# Patient Record
Sex: FEMALE | Race: WHITE | NOT HISPANIC OR LATINO | Employment: OTHER | URBAN - METROPOLITAN AREA
[De-identification: names, ages, dates, MRNs, and addresses within clinical notes are randomized per-mention and may not be internally consistent; named-entity substitution may affect disease eponyms.]

---

## 2017-01-31 ENCOUNTER — OFFICE VISIT (OUTPATIENT)
Dept: FAMILY MEDICINE | Facility: CLINIC | Age: 67
End: 2017-01-31
Payer: COMMERCIAL

## 2017-01-31 VITALS
TEMPERATURE: 98.1 F | HEART RATE: 53 BPM | RESPIRATION RATE: 14 BRPM | HEIGHT: 63 IN | DIASTOLIC BLOOD PRESSURE: 90 MMHG | SYSTOLIC BLOOD PRESSURE: 169 MMHG | BODY MASS INDEX: 30.95 KG/M2 | WEIGHT: 174.7 LBS

## 2017-01-31 DIAGNOSIS — Z11.59 NEED FOR HEPATITIS C SCREENING TEST: ICD-10-CM

## 2017-01-31 DIAGNOSIS — M85.80 OSTEOPENIA: ICD-10-CM

## 2017-01-31 DIAGNOSIS — Z00.00 ROUTINE GENERAL MEDICAL EXAMINATION AT A HEALTH CARE FACILITY: Primary | ICD-10-CM

## 2017-01-31 DIAGNOSIS — Z12.11 COLON CANCER SCREENING: ICD-10-CM

## 2017-01-31 DIAGNOSIS — I10 BENIGN ESSENTIAL HYPERTENSION: ICD-10-CM

## 2017-01-31 LAB
ALBUMIN SERPL-MCNC: 4.1 G/DL (ref 3.4–5)
ALP SERPL-CCNC: 77 U/L (ref 40–150)
ALT SERPL W P-5'-P-CCNC: 107 U/L (ref 0–50)
ANION GAP SERPL CALCULATED.3IONS-SCNC: 7 MMOL/L (ref 3–14)
AST SERPL W P-5'-P-CCNC: 55 U/L (ref 0–45)
BASOPHILS # BLD AUTO: 0 10E9/L (ref 0–0.2)
BASOPHILS NFR BLD AUTO: 0.3 %
BILIRUB SERPL-MCNC: 0.6 MG/DL (ref 0.2–1.3)
BUN SERPL-MCNC: 13 MG/DL (ref 7–30)
CALCIUM SERPL-MCNC: 9.3 MG/DL (ref 8.5–10.1)
CHLORIDE SERPL-SCNC: 104 MMOL/L (ref 94–109)
CO2 SERPL-SCNC: 27 MMOL/L (ref 20–32)
CREAT SERPL-MCNC: 0.74 MG/DL (ref 0.52–1.04)
CREAT UR-MCNC: 27 MG/DL
DIFFERENTIAL METHOD BLD: NORMAL
EOSINOPHIL # BLD AUTO: 0.1 10E9/L (ref 0–0.7)
EOSINOPHIL NFR BLD AUTO: 1.2 %
ERYTHROCYTE [DISTWIDTH] IN BLOOD BY AUTOMATED COUNT: 13.2 % (ref 10–15)
GFR SERPL CREATININE-BSD FRML MDRD: 78 ML/MIN/1.7M2
GLUCOSE SERPL-MCNC: 115 MG/DL (ref 70–99)
HCT VFR BLD AUTO: 40.8 % (ref 35–47)
HCV AB SERPL QL IA: NORMAL
HGB BLD-MCNC: 14.1 G/DL (ref 11.7–15.7)
LYMPHOCYTES # BLD AUTO: 1.8 10E9/L (ref 0.8–5.3)
LYMPHOCYTES NFR BLD AUTO: 27.2 %
MCH RBC QN AUTO: 32.9 PG (ref 26.5–33)
MCHC RBC AUTO-ENTMCNC: 34.6 G/DL (ref 31.5–36.5)
MCV RBC AUTO: 95 FL (ref 78–100)
MICROALBUMIN UR-MCNC: 5 MG/L
MICROALBUMIN/CREAT UR: 18.5 MG/G CR (ref 0–25)
MONOCYTES # BLD AUTO: 0.6 10E9/L (ref 0–1.3)
MONOCYTES NFR BLD AUTO: 9.4 %
NEUTROPHILS # BLD AUTO: 4.1 10E9/L (ref 1.6–8.3)
NEUTROPHILS NFR BLD AUTO: 61.9 %
PLATELET # BLD AUTO: 230 10E9/L (ref 150–450)
POTASSIUM SERPL-SCNC: 4 MMOL/L (ref 3.4–5.3)
PROT SERPL-MCNC: 7.7 G/DL (ref 6.8–8.8)
RBC # BLD AUTO: 4.28 10E12/L (ref 3.8–5.2)
SODIUM SERPL-SCNC: 138 MMOL/L (ref 133–144)
WBC # BLD AUTO: 6.6 10E9/L (ref 4–11)

## 2017-01-31 PROCEDURE — 99213 OFFICE O/P EST LOW 20 MIN: CPT | Mod: 25 | Performed by: FAMILY MEDICINE

## 2017-01-31 PROCEDURE — 82043 UR ALBUMIN QUANTITATIVE: CPT | Performed by: FAMILY MEDICINE

## 2017-01-31 PROCEDURE — 80053 COMPREHEN METABOLIC PANEL: CPT | Performed by: FAMILY MEDICINE

## 2017-01-31 PROCEDURE — G0145 SCR C/V CYTO,THINLAYER,RESCR: HCPCS | Performed by: FAMILY MEDICINE

## 2017-01-31 PROCEDURE — 85025 COMPLETE CBC W/AUTO DIFF WBC: CPT | Performed by: FAMILY MEDICINE

## 2017-01-31 PROCEDURE — 99397 PER PM REEVAL EST PAT 65+ YR: CPT | Performed by: FAMILY MEDICINE

## 2017-01-31 PROCEDURE — G0476 HPV COMBO ASSAY CA SCREEN: HCPCS | Performed by: FAMILY MEDICINE

## 2017-01-31 PROCEDURE — 86803 HEPATITIS C AB TEST: CPT | Performed by: FAMILY MEDICINE

## 2017-01-31 PROCEDURE — 36415 COLL VENOUS BLD VENIPUNCTURE: CPT | Performed by: FAMILY MEDICINE

## 2017-01-31 RX ORDER — HYDROCHLOROTHIAZIDE 12.5 MG/1
12.5 TABLET ORAL DAILY
Qty: 30 TABLET | Refills: 1 | Status: SHIPPED | OUTPATIENT
Start: 2017-01-31 | End: 2018-02-07

## 2017-01-31 NOTE — Clinical Note
[address] [id number] [phone]      February 6, 2017    Sammi Manzano  [address]  [facility] [address]    Dear Sammi,  We are happy to inform you that your PAP smear result from 01/31/17 is normal.  We are now able to do a follow up test on PAP smears. The DNA test is for HPV (Human Papilloma Virus). Cervical cancer is closely linked with certain types of HPV. Your result showed no evidence of high risk HPV.  Per guidelines, you no longer need to have pap smears completed. You will still need to return to the clinic every year for an annual exam and other preventive tests.    Please return on or after 01/31/18 for your next physical exam.  Please contact my office if you have further questions.    Sincerely,  Pattie Little MD/[facility]

## 2017-01-31 NOTE — NURSING NOTE
"Chief Complaint   Patient presents with     Physical       Initial There were no vitals taken for this visit. Estimated body mass index is 29.59 kg/(m^2) as calculated from the following:    Height as of 3/3/15: 5' 3\" (1.6 m).    Weight as of 3/16/15: 167 lb (75.751 kg).  BP completed using cuff size: regular rt arm Jacinta Mosley MA      "

## 2017-01-31 NOTE — MR AVS SNAPSHOT
After Visit Summary   1/31/2017    Sammi Manzano    MRN: [id number]           Patient Information     Date Of Birth          1950        Visit Information        Provider Department      1/31/2017 7:00 AM Pattie Little MD [facility]        Today's Diagnoses     Benign essential hypertension    -  1     Routine general medical examination at a health care facility         Need for hepatitis C screening test         Osteopenia         Colon cancer screening           Care Instructions      Preventive Health Recommendations  Female Ages 65 +    Yearly exam:     See your health care provider every year in order to  o Review health changes.   o Discuss preventive care.    o Review your medicines if your doctor has prescribed any.      You no longer need a yearly Pap test unless you've had an abnormal Pap test in the past 10 years. If you have vaginal symptoms, such as bleeding or discharge, be sure to talk with your provider about a Pap test.      Every 1 to 2 years, have a mammogram.  If you are over 69, talk with your health care provider about whether or not you want to continue having screening mammograms.      Every 10 years, have a colonoscopy. Or, have a yearly FIT test (stool test). These exams will check for colon cancer.       Have a cholesterol test every 5 years, or more often if your doctor advises it.       Have a diabetes test (fasting glucose) every three years. If you are at risk for diabetes, you should have this test more often.       At age 65, have a bone density scan (DEXA) to check for osteoporosis (brittle bone disease).    Shots:    Get a flu shot each year.    Get a tetanus shot every 10 years.    Talk to your doctor about your pneumonia vaccines. There are now two you should receive - Pneumovax (PPSV 23) and Prevnar (PCV 13).    Talk to your doctor about the shingles vaccine.    Talk to your doctor about the hepatitis B  vaccine.    Nutrition:     Eat at least 5 servings of fruits and vegetables each day.      Eat whole-grain bread, whole-wheat pasta and brown rice instead of white grains and rice.      Talk to your provider about Calcium and Vitamin D.     Lifestyle    Exercise at least 150 minutes a week (30 minutes a day, 5 days a week). This will help you control your weight and prevent disease.      Limit alcohol to one drink per day.      No smoking.       Wear sunscreen to prevent skin cancer.       See your dentist twice a year for an exam and cleaning.      See your eye doctor every 1 to 2 years to screen for conditions such as glaucoma, macular degeneration, cataracts, etc         Follow-ups after your visit        Additional Services     GASTROENTEROLOGY ADULT REF PROCEDURE ONLY       Last Lab Result: CREATININE (mg/dL)       Date                     Value                 03/03/2015               0.77             ----------  Body mass index is 30.95 kg/(m^2).     Needed:  No  Language:  English    Patient will be contacted to schedule procedure.     Please be aware that coverage of these services is subject to the terms and limitations of your health insurance plan.  Call member services at your health plan with any benefit or coverage questions.  Any procedures must be performed at a [facility] facility OR coordinated by your clinic's referral office.    Please bring the following with you to your appointment:    (1) Any X-Rays, CTs or MRIs which have been performed.  Contact the facility where they were done to arrange for  prior to your scheduled appointment.    (2) List of current medications   (3) This referral request   (4) Any documents/labs given to you for this referral                  Future tests that were ordered for you today     Open Future Orders        Priority Expected Expires Ordered    DX Hip/Pelvis/Spine Routine  1/31/2018 1/31/2017    *MA Screening Digital Bilateral Routine  1/31/2018  "2017            Who to contact     If you have questions or need follow up information about today's clinic visit or your schedule please contact [facility] directly at [phone].  Normal or non-critical lab and imaging results will be communicated to you by MyChart, letter or phone within 4 business days after the clinic has received the results. If you do not hear from us within 7 days, please contact the clinic through MyChart or phone. If you have a critical or abnormal lab result, we will notify you by phone as soon as possible.  Submit refill requests through [employer] or call your pharmacy and they will forward the refill request to us. Please allow 3 business days for your refill to be completed.          Additional Information About Your Visit        [facility] Information     [employer] lets you send messages to your doctor, view your test results, renew your prescriptions, schedule appointments and more. To sign up, go to [email or web address] . Click on \"Log in\" on the left side of the screen, which will take you to the Welcome page. Then click on \"Sign up Now\" on the right side of the page.     You will be asked to enter the access code listed below, as well as some personal information. Please follow the directions to create your username and password.     Your access code is: [email or web address]  Expires: 2017  7:58 AM     Your access code will  in 90 days. If you need help or a new code, please call your [place] clinic or [phone].        Care EveryWhere ID     This is your Care EveryWhere ID. This could be used by other organizations to access your [place] medical records  [id number]        Your Vitals Were     Pulse Temperature Respirations Height BMI (Body Mass Index) Breastfeeding?    53 98.1  F (36.7  C) (Oral) 14 5' 3\" (1.6 m) 30.95 kg/m2 No       Blood Pressure from Last 3 Encounters:   17 169/90   03/16/15 142/90   03/03/15 144/92    Weight from Last 3 " Encounters:   01/31/17 174 lb 11.2 oz (79.243 kg)   03/16/15 167 lb (75.751 kg)   03/03/15 165 lb 3.2 oz (74.934 kg)              We Performed the Following     Albumin Random Urine Quantitative     CBC with platelets differential     Comprehensive metabolic panel     GASTROENTEROLOGY ADULT REF PROCEDURE ONLY     Hepatitis C antibody     HPV High Risk Types DNA Cervical     Pap imaged thin layer screen with HPV - recommended age 30 - 65 years (select HPV order below)          Today's Medication Changes          These changes are accurate as of: 1/31/17  7:59 AM.  If you have any questions, ask your nurse or doctor.               Start taking these medicines.        Dose/Directions    hydrochlorothiazide 12.5 MG Tabs tablet   Used for:  Benign essential hypertension   Started by:  Pattie Little MD        Dose:  12.5 mg   Take 1 tablet (12.5 mg) by mouth daily   Quantity:  30 tablet   Refills:  1            Where to get your medicines      These medications were sent to [id number] - [place], [place] - [address]  [address], [facility] [address]     Phone:  [phone]    - hydrochlorothiazide 12.5 MG Tabs tablet             Primary Care Provider Office Phone # Fax #    Pattie Little [phone]       [facility] [address] [place] [address]        Thank you!     Thank you for choosing [facility]  for your care. Our goal is always to provide you with excellent care. Hearing back from our patients is one way we can continue to improve our services. Please take a few minutes to complete the written survey that you may receive in the mail after your visit with us. Thank you!             Your Updated Medication List - Protect others around you: Learn how to safely use, store and throw away your medicines at www.disposemymeds.org.          This list is accurate as of: 1/31/17  7:59 AM.  Always use your most  recent med list.                   Brand Name Dispense Instructions for use    ASPIRIN PO      Take 81 mg by mouth       CALCIUM 600 + D 600-200 MG-UNIT Tabs     3 MONTHS    2 q d       hydrochlorothiazide 12.5 MG Tabs tablet     30 tablet    Take 1 tablet (12.5 mg) by mouth daily

## 2017-01-31 NOTE — PROGRESS NOTES
SUBJECTIVE:     CC: Sammi Manzano is an 66 year old woman who presents for preventive health visit.     Healthy Habits:    Do you get at least three servings of calcium containing foods daily (dairy, green leafy vegetables, etc.)? yes    Amount of exercise or daily activities, outside of work: 6 day(s) per week    Problems taking medications regularly not applicable    Medication side effects: No    Have you had an eye exam in the past two years? yes    Do you see a dentist twice per year? yes    Do you have sleep apnea, excessive snoring or daytime drowsiness?no    Other:  1. Blood pressure- 146-175/.   Gets them checked periodically at [facility] and it has been noted to be high over the last couple of years.     PCV 13 - 11/8/15  PCV 23- 11/14/16 ([facility])    Today's PHQ-2 Score:   PHQ-2 ( 1999 Pf[employer]) 3/3/2015 3/29/2012   Q1: Little interest or pleasure in doing things 0 0   Q2: Feeling down, depressed or hopeless 0 0   PHQ-2 Score 0 0       Abuse: Current or Past(Physical, Sexual or Emotional)- No  Do you feel safe in your environment - Yes    Social History   Substance Use Topics     Smoking status: Former Smoker -- 17 years     Types: Cigarettes     Quit date: 01/01/1986     Smokeless tobacco: Never Used     Alcohol Use: No     none    Recent Labs   Lab Test  03/03/15   0751  01/03/13   0751   CHOL  198  221*   HDL  68  79   LDL  114  118   TRIG  78  118   CHOLHDLRATIO  2.9  2.8       Reviewed orders with patient.  Reviewed health maintenance and updated orders accordingly - Yes    Mammo Decision Support:  Patient over age 50, mutual decision to screen reflected in health maintenance.    Pertinent mammograms are reviewed under the imaging tab.  History of abnormal Pap smear: NO - age 30- 65 PAP every 3 years recommended  All Histories reviewed and updated in Epic.      ROS:  C: NEGATIVE for fever, chills, change in weight  I: NEGATIVE for worrisome rashes, moles or lesions  E:  "NEGATIVE for vision changes or irritation  ENT: NEGATIVE for ear, mouth and throat problems  R: NEGATIVE for significant cough or SOB  B: NEGATIVE for masses, tenderness or discharge  CV: NEGATIVE for chest pain, palpitations or peripheral edema  GI: NEGATIVE for nausea, abdominal pain, heartburn, or change in bowel habits  : NEGATIVE for unusual urinary or vaginal symptoms. No vaginal bleeding.  M: NEGATIVE for significant arthralgias or myalgia  N: NEGATIVE for weakness, dizziness or paresthesias  P: NEGATIVE for changes in mood or affect     Problem list, Medication list, Allergies, and Medical/Social/Surgical histories reviewed in [facility] and updated as appropriate.  OBJECTIVE:     /90 mmHg  Pulse 53  Temp(Src) 98.1  F (36.7  C) (Oral)  Resp 14  Ht 5' 3\" (1.6 m)  Wt 174 lb 11.2 oz (79.243 kg)  BMI 30.95 kg/m2  Breastfeeding? No  EXAM:  GENERAL: healthy, alert and no distress  EYES: Eyes grossly normal to inspection, PERRL and conjunctivae and sclerae normal  HENT: ear canals and TM's normal, nose and mouth without ulcers or lesions  NECK: no adenopathy, no asymmetry, masses, or scars and thyroid normal to palpation  RESP: lungs clear to auscultation - no rales, rhonchi or wheezes  CV: regular rate and rhythm, normal S1 S2, no S3 or S4, no murmur, click or rub, no peripheral edema and peripheral pulses strong  ABDOMEN: soft, nontender, no hepatosplenomegaly, no masses and bowel sounds normal   (female): normal female external genitalia, normal urethral meatus , vaginal mucosal atrophy and normal cervix, adnexae, and uterus without masses.  MS: no gross musculoskeletal defects noted, no edema  SKIN: no suspicious lesions or rashes  NEURO: Normal strength and tone, mentation intact and speech normal  PSYCH: mentation appears normal, affect normal/bright    ASSESSMENT/PLAN:     1. Routine general medical examination at a health care facility  - pap done today- if negative will be her last one as she is " "66.   - Pap imaged thin layer screen with HPV - recommended age 30 - 65 years (select HPV order below)  - HPV High Risk Types DNA Cervical  - CBC with platelets differential  - *MA Screening Digital Bilateral; Future    2. Benign essential hypertension  - from chart review seems patient has had high BP for years. Was started on lisinopril a few years ago but never took it.   Reviewed the importance of treating persistently elevated BP. She verbalizes understanding and is open to starting medication today.   Will return for med/BP check in 1 month or sooner if needed.   - Albumin Random Urine Quantitative  - Comprehensive metabolic panel  - hydrochlorothiazide 12.5 MG TABS tablet; Take 1 tablet (12.5 mg) by mouth daily  Dispense: 30 tablet; Refill: 1    3. Need for hepatitis C screening test  - Hepatitis C antibody    4. Osteopenia  - continue with calcium/vit D  - DX Hip/Pelvis/Spine; Future    5. Colon cancer screening    - GASTROENTEROLOGY ADULT REF PROCEDURE ONLY    COUNSELING:   Reviewed preventive health counseling, as reflected in patient instructions       Regular exercise       Healthy diet/nutrition       Consider Hep C screening for patients born between 1945 and 1965         reports that she quit smoking about 31 years ago. Her smoking use included Cigarettes. She quit after 17 years of use. She has never used smokeless tobacco.    Estimated body mass index is 30.95 kg/(m^2) as calculated from the following:    Height as of this encounter: 5' 3\" (1.6 m).    Weight as of this encounter: 174 lb 11.2 oz (79.243 kg).   Weight management plan: Discussed healthy diet and exercise guidelines and patient will follow up in 12 months in clinic to re-evaluate.    Counseling Resources:  ATP IV Guidelines  Pooled Cohorts Equation Calculator  Breast Cancer Risk Calculator  FRAX Risk Assessment  ICSI Preventive Guidelines  Dietary Guidelines for Americans, 2010  USDA's MyPlate  ASA Prophylaxis  Lung CA Screening    Pattie " Nohemy Little MD  [facility]

## 2017-01-31 NOTE — Clinical Note
[facility]  [address] [place], [place], [address]  [phone]      February 6, 2017    Sammi Manzano  [address] [facility] [address]          Dear Sammi,    The results of your recent tests are back, Recent labs from visit are all unremarkable.   For further questions or concerns please let us know.   Enclosed is a copy of the results.  It was a pleasure to see you at your last appointment.    Results for orders placed or performed in visit on 01/31/17   Albumin Random Urine Quantitative   Result Value Ref Range    Creatinine Urine 27 mg/dL    Albumin Urine mg/L 5 mg/L    Albumin Urine mg/g Cr 18.50 0 - 25 mg/g Cr   Pap imaged thin layer screen with HPV - recommended age 30 - 65 years (select HPV order below)   Result Value Ref Range    PAP NIL     Copath Report         Patient Name: SAMMI DELCID  MR#: [id number]  Specimen #: [id number]  Collected: 1/31/2017  Received: 1/31/2017  Reported: 2/1/2017 14:40  Ordering Phy(s): MYRTLE FOY    For improved result formatting, select 'View Enhanced Report Format'  under Linked Documents section.    SPECIMEN/STAIN PROCESS:  Pap imaged thin layer prep screening (Surepath, FocalPoint with guided  screening)       Pap-Cyto x 1, HPV ordered x 1    SOURCE: Cervical, endocervical  ----------------------------------------------------------------   Pap imaged thin layer prep screening (Surepath, FocalPoint with guided  screening)  SPECIMEN ADEQUACY:  Satisfactory for evaluation.  -Transformation zone component present.    CYTOLOGIC INTERPRETATION:    Negative for Intraepithelial Lesion or Malignancy    Electronically signed out by:  MELINA Russo  (ASCP)    Processed and screened at [facility],  [facility]    CLINICAL HISTORY:    Post Menopaus al, Previous normal pap  Date of Last Pap: 1/3/2013,    Papanicolaou Test Limitations:  Cervical cytology is a screening  test  with limited sensitivity; regular screening is critical for cancer  prevention; Pap tests are primarily effective for the  diagnosis/prevention of squamous cell carcinoma, not adenocarcinomas or  other cancers.    TESTING LAB LOCATION:  [facility]  [address] [place]  [address] [phone]    COLLECTION SITE:  Client:  [facility]  Location: [facility] (R)     HPV High Risk Types DNA Cervical   Result Value Ref Range    HPV 16 DNA Negative NEG    HPV 18 DNA Negative NEG    Other HR HPV Negative NEG    Final Diagnosis       This patient's sample is negative for HPV DNA.  (Note)  METHODOLOGY:  The Roche santa 4800 system uses automated extraction,  simultaneous amplification of HPV (L1 region) and beta-globin,  followed by  real time detection of fluorescent labeled HPV and beta  globin using specific oligonucleotide probes . The test specifically  identifies types HPV 16 DNA and HPV 18 DNA while concurrently  detecting the rest of the high risk types (31, 33, 35, 39, 45, 51,  52, 56, 58, 59, 66 or 68).    COMMENTS:  This test is not intended for use as a screening device  for women under age 30 with normal cervical cytology.  Results should  be correlated with cytologic and histologic findings. Close clinical  followup is recommended.    This test was developed and its performance characteristics  determined by the [facility], [facility]. It has not been cleared or approved by the  FDA. The laboratory is regulated under CLIA as qualified to perform  high- complexity testing. This test is used for clinical purposes. It  should not be regarded as investigational or for research.        Specimen Description Cervical Cells  [id number]      CBC with platelets differential   Result Value Ref Range    WBC 6.6 4.0 - 11.0 10e9/L    RBC Count 4.28 3.8 - 5.2 10e12/L    Hemoglobin 14.1 11.7 - 15.7 g/dL    Hematocrit 40.8 35.0 - 47.0  %    MCV 95 78 - 100 fl    MCH 32.9 26.5 - 33.0 pg    MCHC 34.6 31.5 - 36.5 g/dL    RDW 13.2 10.0 - 15.0 %    Platelet Count 230 150 - 450 10e9/L    Diff Method Automated Method     % Neutrophils 61.9 %    % Lymphocytes 27.2 %    % Monocytes 9.4 %    % Eosinophils 1.2 %    % Basophils 0.3 %    Absolute Neutrophil 4.1 1.6 - 8.3 10e9/L    Absolute Lymphocytes 1.8 0.8 - 5.3 10e9/L    Absolute Monocytes 0.6 0.0 - 1.3 10e9/L    Absolute Eosinophils 0.1 0.0 - 0.7 10e9/L    Absolute Basophils 0.0 0.0 - 0.2 10e9/L   Comprehensive metabolic panel   Result Value Ref Range    Sodium 138 133 - 144 mmol/L    Potassium 4.0 3.4 - 5.3 mmol/L    Chloride 104 94 - 109 mmol/L    Carbon Dioxide 27 20 - 32 mmol/L    Anion Gap 7 3 - 14 mmol/L    Glucose 115 (H) 70 - 99 mg/dL    Urea Nitrogen 13 7 - 30 mg/dL    Creatinine 0.74 0.52 - 1.04 mg/dL    GFR Estimate 78 >60 mL/min/1.7m2    GFR Estimate If Black >90   GFR Calc   >60 mL/min/1.7m2    Calcium 9.3 8.5 - 10.1 mg/dL    Bilirubin Total 0.6 0.2 - 1.3 mg/dL    Albumin 4.1 3.4 - 5.0 g/dL    Protein Total 7.7 6.8 - 8.8 g/dL    Alkaline Phosphatase 77 40 - 150 U/L     (H) 0 - 50 U/L    AST 55 (H) 0 - 45 U/L   Hepatitis C antibody   Result Value Ref Range    Hepatitis C Antibody  NR     Nonreactive   Assay performance characteristics have not been established for newborns,   infants, and children           If you have any questions or concerns, please call myself or my nurse at [phone].          Sincerely,    Pattie Little MD/[facility]  [id number]

## 2017-02-01 ENCOUNTER — RADIANT APPOINTMENT (OUTPATIENT)
Dept: MAMMOGRAPHY | Facility: CLINIC | Age: 67
End: 2017-02-01
Payer: COMMERCIAL

## 2017-02-01 DIAGNOSIS — Z00.00 ROUTINE GENERAL MEDICAL EXAMINATION AT A HEALTH CARE FACILITY: ICD-10-CM

## 2017-02-01 LAB
COPATH REPORT: NORMAL
PAP: NORMAL

## 2017-02-01 PROCEDURE — G0202 SCR MAMMO BI INCL CAD: HCPCS | Mod: TC

## 2017-02-03 LAB
FINAL DIAGNOSIS: NORMAL
HPV HR 12 DNA CVX QL NAA+PROBE: NEGATIVE
HPV16 DNA SPEC QL NAA+PROBE: NEGATIVE
HPV18 DNA SPEC QL NAA+PROBE: NEGATIVE
SPECIMEN DESCRIPTION: NORMAL

## 2017-02-03 NOTE — PROGRESS NOTES
Quick Note:    Please send patient a letter to let them know results are normal.    Recent labs from visit are all unremarkable.   For further questions or concerns please let us know.     Sincerely,    Dr. Salguero  ______

## 2017-02-22 ENCOUNTER — HOSPITAL ENCOUNTER (OUTPATIENT)
Facility: CLINIC | Age: 67
Discharge: HOME | End: 2017-02-22
Attending: INTERNAL MEDICINE | Admitting: INTERNAL MEDICINE
Payer: COMMERCIAL

## 2017-02-22 VITALS
BODY MASS INDEX: 30.83 KG/M2 | OXYGEN SATURATION: 95 % | SYSTOLIC BLOOD PRESSURE: 123 MMHG | WEIGHT: 174 LBS | DIASTOLIC BLOOD PRESSURE: 83 MMHG | HEIGHT: 63 IN | RESPIRATION RATE: 14 BRPM | HEART RATE: 77 BPM

## 2017-02-22 LAB — COLONOSCOPY: NORMAL

## 2017-02-22 PROCEDURE — G0121 COLON CA SCRN NOT HI RSK IND: HCPCS | Performed by: INTERNAL MEDICINE

## 2017-02-22 PROCEDURE — 25000128 H RX IP 250 OP 636: Performed by: INTERNAL MEDICINE

## 2017-02-22 PROCEDURE — 25000125 ZZHC RX 250: Performed by: INTERNAL MEDICINE

## 2017-02-22 PROCEDURE — G0500 MOD SEDAT ENDO SERVICE >5YRS: HCPCS | Performed by: INTERNAL MEDICINE

## 2017-02-22 PROCEDURE — 45378 DIAGNOSTIC COLONOSCOPY: CPT | Performed by: INTERNAL MEDICINE

## 2017-02-22 RX ORDER — ONDANSETRON 4 MG/1
4 TABLET, ORALLY DISINTEGRATING ORAL EVERY 6 HOURS PRN
Status: DISCONTINUED | OUTPATIENT
Start: 2017-02-22 | End: 2017-02-22 | Stop reason: HOSPADM

## 2017-02-22 RX ORDER — FLUMAZENIL 0.1 MG/ML
0.2 INJECTION, SOLUTION INTRAVENOUS
Status: DISCONTINUED | OUTPATIENT
Start: 2017-02-22 | End: 2017-02-22 | Stop reason: HOSPADM

## 2017-02-22 RX ORDER — FENTANYL CITRATE 50 UG/ML
INJECTION, SOLUTION INTRAMUSCULAR; INTRAVENOUS PRN
Status: DISCONTINUED | OUTPATIENT
Start: 2017-02-22 | End: 2017-02-22 | Stop reason: HOSPADM

## 2017-02-22 RX ORDER — ONDANSETRON 2 MG/ML
4 INJECTION INTRAMUSCULAR; INTRAVENOUS
Status: DISCONTINUED | OUTPATIENT
Start: 2017-02-22 | End: 2017-02-22 | Stop reason: HOSPADM

## 2017-02-22 RX ORDER — NALOXONE HYDROCHLORIDE 0.4 MG/ML
.1-.4 INJECTION, SOLUTION INTRAMUSCULAR; INTRAVENOUS; SUBCUTANEOUS
Status: DISCONTINUED | OUTPATIENT
Start: 2017-02-22 | End: 2017-02-22 | Stop reason: HOSPADM

## 2017-02-22 RX ORDER — ONDANSETRON 2 MG/ML
4 INJECTION INTRAMUSCULAR; INTRAVENOUS EVERY 6 HOURS PRN
Status: DISCONTINUED | OUTPATIENT
Start: 2017-02-22 | End: 2017-02-22 | Stop reason: HOSPADM

## 2017-02-22 RX ORDER — LIDOCAINE 40 MG/G
CREAM TOPICAL
Status: DISCONTINUED | OUTPATIENT
Start: 2017-02-22 | End: 2017-02-22 | Stop reason: HOSPADM

## 2017-02-22 NOTE — LETTER
January 31, 2017      Sammi Manzano  [address]  [facility] [address]              Dear Sammi Manzano,    Thank you for choosing [facility]. You are scheduled for the following service(s).   Miralax Prep    Procedure:   Colonoscopy   Provider:         Dr. Ferreira  Date:    2-22-17                Arrival Time:   0700  *check in at Emergency/Endoscopy desk*  Procedure Time:  0730     Location:   [facility]      Endoscopy Department, First Floor (Enter through ER Doors) *       [address] [place], [place] [address]    [phone] or [phone] () to reschedule   What is a colonoscopy?  A colonoscopy is the most accurate test to detect colon polyps and colon cancer, and the only test where polyps can be removed. During this procedure, a doctor examines the lining of your large intestine and rectum through a flexible tube called a colonoscope.   To produce the best and most accurate results, your colon must be completely clean. You will drink a special bowel cleansing preparation to help clean out your colon. You will also need to follow a special diet several days prior to your scheduled colonoscopy.  What happens during a colonoscopy?  Plan to spend up to two hours, starting at registration time, at the endoscopy center the day of your procedure. The exam itself takes approximately 15 minutes to complete, and recovery is approximately 30 minutes.     Before the exam:    You will change into a gown.    Your medical history will be reviewed with you and you will be given a consent form to sign.     A nurse will insert an intravenous (IV) line into your hand or arm.  During the exam:     Medicine will be given through the IV line to help you relax and feel drowsy.     Your heart rate and oxygen levels will be monitored. If your blood pressure is low, you may be given fluids through the IV line.     The doctor will insert a  flexible hollow tube, called a colonoscope, into your rectum.   The scope will be advanced slowly through the large intestine (colon).    You may have a feeling of pressure or fullness.     If an abnormal tissue, or a polyp are found, the doctor may remove it through the endoscope for closer examination, or biopsy. Tissue removal is painless.  What happens after the exam?           The doctor will talk with you about the initial results of your exam.     The doctor will prepare a full report for the physician who referred you for the colonoscopy.     You may feel bloated after the procedure. This is normal.     Medication given during the exam will prohibit you from driving for the rest of the day.     Following the exam, you may resume your normal diet. Avoid alcohol until the next day.     You may resume your regular activities the day after the procedure.     A nurse will provide you with complete discharge instructions before you leave the endoscopy center. Be sure to ask the nurse for specific instructions if you take blood thinners such as aspirin, Coumadin or Plavix.     Any tissue samples removed during the exam will be sent to a lab for evaluation. It may take 5-7 working days for you to be notified of the results.     Miralax-Gatorade  If you have diabetes, ask your regular doctor for diet and medication restrictions.   If you take a medication to thin your blood, such as coumadin or warfarin, please call your primary care provider for directions on when to stop this medication.  If you take aspirin, you may continue to do so.  If you are or may be pregnant, please discuss the risks and benefits of this procedure with your doctor.  You must arrange for a ride for the day of your exam. If you fail to arrange transportation with a responsible adult (someone you know and trust) your procedure will need to be cancelled and rescheduled.  If you must cancel or reschedule your appointment, please call  [phone] as soon as possible.        PREPARATION  To ensure a successful exam, please follow all instructions carefully. Failure to accurately and completely prepare for your exam may result in the need for an additional procedure and both procedures will be billed to your insurance.     Purchase the following over-the-counter supplies at your local pharmacy:      ? 2 tablets bisacodyl, each containing 5 mg of bisacodyl (Dulcolax  laxative NOT Dulcolax  stool softener)   ? 1-8.3 oz. bottle Miralax  powder (238 grams)   ? 64 oz. Gatorade  liquid (NOT red; NOT powdered). Regular Gatorade , Gatorade G2 , Powerade  or  PoweradeZero  are acceptable.   ? 1-10 oz. bottle Magnesium Citrate (NOT red)  7 days before your exam:  Discontinue fiber supplements or medications containing iron. This includes multivitamins with iron, Metamucil  and Fibercon .    3 Days prior to your exam:  Stop eating all high-fiber foods and begin a Low-Fiber Diet. A low fiber diet helps make the cleanout more effective.     Examples of a Low Fiber Diet include:     White bread, white rice, pasta, potato without skin, plain crackers     Fish, white meat chicken, eggs, peanut butter without nuts     Clear beverages (apple juice, white grape juice, Sprite , sparkling water, Gatorade )     Cooked carrots, cooked green beans, cooked spinach        Milk, plain yogurt, cheese     Jelly, salt, pepper, sugar  Avoid: Raw fruits or vegetables, whole wheat or high fiber foods, seeds, nuts, popcorn, bran or bulking agents     2 days prior to your exam     Continue Low Fiber Diet.     Drink at least 8 (eight ounces) glasses of water throughout the day.     Refrigerate the Gatorade  or Powerade , if you wish to drink it cold.     Stop eating solid foods at 11:45 pm.    1 day prior to your exam  Start a Clear Liquid Diet   Examples of a Clear Liquid Diet:     No red liquids; No coffee; No alcohol; No dairy products     May drink clear caffeinated  beverages    Water: drink at least 8 glasses of water during the day     Tea (do not add milk or creamer)     Clear broth or bouillon     Gatorade , Pedialyte  or Powerade  (No red)     Carbonated and non-carbonated soft drinks (Sprite , 7-Up , Ginger ale)     Strained fruit juices without pulp (apple, white grape, white cranberry)     Jell-O , popsicles, hard candy (No red)    At 12 Noon:  Take the 2 bisacodyl (Dulcolax ) tablets    At 4 PM (no later than 6 PM)    Mix 1 bottle of Miralax  (8.3 oz) with 64 oz. of Gatorade  in a large pitcher.     Drink 1 - 8 oz. glass of the Miralax /Gatorade  solution.     Continue drinking 1 - 8 oz. glass every 15 minutes thereafter until the mixture is gone.     Continue clear liquid diet     Colon Cleansing Tips     If you experience nausea or vomiting while taking the prep, rinse your mouth with water,  or mouthwash , take a 15-30 minute break, and then continue taking the prep solution. If you are still unable to complete the prep, without severe nausea or vomiting, you will need to contact your primary care provider (the provider who ordered the test) for a possible anti-nausea medication. Or if you are prone to nausea you may want to ask your primary care provider to prescribe an as needed anti-nausea medication that you may have on hand.    Chill the Miralax /Gatorade  solution in the refrigerator. DO NOT add ice to the solution or your drinking glass.      Set a timer for every 15 minutes. Drink each 8 - oz. glass of solution quickly to help flush your colon.     Stay near a toilet! You will have diarrhea.     Even if you are sitting on the toilet, continue to drink the cleansing solution every 15 minutes.     Drink all of the solution until it is gone.     You will be uncomfortable until the stool has flushed from your colon (in about 2-4 hours). You may feel chilled.     You may suck on a few hard candies (NO red).     Alcohol-free baby wipes or Vaseline  may help ease  skin irritation.     Over-the-counter hydrocortisone creams, hemorrhoid treatments or Tucks may be used if desired.    The day of your exam:            Continue clear liquid diet. Do not eat solid foods.     You may take all of your morning medications.    4 hours before your procedure:     Drink 10 oz. of Magnesium Citrate.    2 hours before your procedure:     Stop drinking clear liquids.     Allow extra time to travel to your procedure as you may need to stop and use a restroom along the way.  You are ready for the exam, if you followed all instructions and your stool is no longer formed, but clear or yellow liquid. If you are unsure whether your colon is clean, please call our department at [phone] before you leave for your appointment.      Bring a list of all of your current medications, including any allergy or over-the-counter medications.      Bring a photo ID, as well as up-to-date insurance information, such as your insurance card and any referral forms that might be required by your insurance company.  DIRECTIONS  From the north ([facility], [place])  Take [address] south, exit to [address] [address]. Get into the left hand lucie, turn left (east), go one-half mile to [address]. Turn left (north) on [address]. Go north to first stoplight, take a right on the newly constructed [address] and follow it to the Emergency entrance.  From the south ([facility])  Take 35 north to the east split, [address], and exit to [address] [address]. Turn left (west) on [address] [address] to [address]. Turn right (north) on [address]. Go north to first stoplight, take a right on the newly [address] and follow it to the Emergency entrance.    From the east via 35E ([facility])  Take 35E south to [address] [address] exit. Turn right on [address] [address]. Go west to [address]. Turn right (north) on [address], go to the first stoplight, take a right and  follow the newly constructed road, [address], to the Emergency entrance.    From the east via [address] ([facility])  Take [address] west to [address]. Turn left (south) on [address] to [address]. Turn left (east) on [address] and follow the newly constructed road to the Emergency entrance.    From the west via Highway 13 ([place])  Take [address] east to [address]. Turn right (south) on [address] to [address].  Turn left (east) on [address] and follow the newly constructed road to the Emergency entrance.  Cut along line  -------------------------------------------------------------------------------------------------------------------  SHOPPING LIST FOR OVER-THE-COUNTER COLONOSCOPY PREP:  ? 2 tablets bisacodyl, each containing 5 mg of bisacodyl (Dulcolax  laxative                     NOT Dulcolax  stool softener)   ? 1-8.3 oz. bottle Miralax  powder (238 grams)   ? 64 oz. Gatorade  liquid (NOT red; NOT powdered). Regular Gatorade ,                     Gatorade G2 , Powerade  or  PoweradeZero  are acceptable.   ? 1-10 oz. bottle Magnesium Citrate (NOT red)

## 2017-02-22 NOTE — H&P
Pre-Endoscopy History and Physical     Sammi Manzano MRN# [id number]   YOB: 1950 Age: 66 year old     Date of Procedure: 2/22/2017  Primary care provider: Pattie Little  Type of Endoscopy: Colonoscopy with possible biopsy, possible polypectomy  Reason for Procedure: screen  Type of Anesthesia Anticipated: Conscious Sedation    HPI:    Sammi is a 66 year old female who will be undergoing the above procedure.      A history and physical has been performed. The patient's medications and allergies have been reviewed. The risks and benefits of the procedure and the sedation options and risks were discussed with the patient.  All questions were answered and informed consent was obtained.      She denies a personal or family history of anesthesia complications or bleeding disorders.     Patient Active Problem List   Diagnosis     Hyperlipidemia     Osteopenia     HYPERLIPIDEMIA LDL GOAL <160     Glucose intolerance (impaired glucose tolerance)     Family history of diabetes mellitus     HL (hearing loss)     Diverticulosis     Advanced directives, counseling/discussion     Overweight (BMI 25.0-29.9)     OA (osteoarthritis) of knee     Cough - postinfectious     Chronic cough        Past Medical History   Diagnosis Date     Chronic cough 3/7/2015     Diverticulosis 3/28/2011     Glucose intolerance (impaired glucose tolerance) 3/28/2011     HL (hearing loss) 3/28/2011     Hyperlipidemia LDL goal <160 10/31/2010     Hypertension      OA (osteoarthritis) of knee 1/12/2013     Osteopenia 2/18/2009     Overweight (BMI 25.0-29.9) 3/29/2012        Past Surgical History   Procedure Laterality Date     Tonsillectomy       at younger age-?4-5       Social History   Substance Use Topics     Smoking status: Former Smoker     Years: 17.00     Types: Cigarettes     Quit date: 1/1/1986     Smokeless tobacco: Never Used     Alcohol use No       Family History   Problem Relation Age of Onset      "DIABETES Mother      alive 88 in 2010,stroke in 2012     Psychotic Disorder Mother      Dementia     Hypertension Mother      CANCER Father      liver ca     Family History Negative Maternal Grandmother      HEART DISEASE Maternal Grandfather      heart attack at age of ?AGE     CANCER Paternal Grandmother      not known details     Family History Negative Paternal Grandfather       from old age     Family History Negative Brother      alive 57     Breast Cancer No family hx of      Cancer - colorectal No family hx of        Prior to Admission medications    Medication Sig Start Date End Date Taking? Authorizing Provider   hydrochlorothiazide 12.5 MG TABS tablet Take 1 tablet (12.5 mg) by mouth daily 17  Yes Pattie Little MD   ASPIRIN PO Take 81 mg by mouth    Reported, Patient   CALCIUM 600 + D 600-200 MG-UNIT OR TABS 2 q d 07   Catherine Viera MD       Allergies   Allergen Reactions     No Known Drug Allergy         REVIEW OF SYSTEMS:   5 point ROS negative except as noted above in HPI, including Gen., Resp., CV, GI &  system review.    PHYSICAL EXAM:   There were no vitals taken for this visit. Estimated body mass index is 30.95 kg/(m^2) as calculated from the following:    Height as of 17: 1.6 m (5' 3\").    Weight as of 17: 79.2 kg (174 lb 11.2 oz).   GENERAL APPEARANCE: alert, and oriented  MENTAL STATUS: alert  AIRWAY EXAM: Mallampatti Class I (visualization of the soft palate, fauces, uvula, anterior and posterior pillars)  RESP: lungs clear to auscultation - no rales, rhonchi or wheezes  CV: regular rates and rhythm  DIAGNOSTICS:    Not indicated    IMPRESSION   ASA Class 1 - Healthy patient, no medical problems    PLAN:   Plan for Colonoscopy with possible biopsy, possible polypectomy. We discussed the risks, benefits and alternatives and the patient wished to proceed.    The above has been forwarded to the consulting provider.      Signed Electronically by: Lenin ODEN" Hanna  February 22, 2017

## 2017-03-01 ENCOUNTER — RADIANT APPOINTMENT (OUTPATIENT)
Dept: BONE DENSITY | Facility: CLINIC | Age: 67
End: 2017-03-01
Attending: FAMILY MEDICINE
Payer: COMMERCIAL

## 2017-03-01 DIAGNOSIS — M85.80 OSTEOPENIA: ICD-10-CM

## 2017-03-01 PROCEDURE — 77080 DXA BONE DENSITY AXIAL: CPT | Performed by: INTERNAL MEDICINE

## 2017-03-27 ENCOUNTER — OFFICE VISIT (OUTPATIENT)
Dept: FAMILY MEDICINE | Facility: CLINIC | Age: 67
End: 2017-03-27
Payer: COMMERCIAL

## 2017-03-27 VITALS
HEART RATE: 67 BPM | BODY MASS INDEX: 31.54 KG/M2 | TEMPERATURE: 98.2 F | SYSTOLIC BLOOD PRESSURE: 145 MMHG | WEIGHT: 178 LBS | RESPIRATION RATE: 16 BRPM | HEIGHT: 63 IN | DIASTOLIC BLOOD PRESSURE: 85 MMHG

## 2017-03-27 DIAGNOSIS — I10 BENIGN ESSENTIAL HYPERTENSION: Primary | ICD-10-CM

## 2017-03-27 PROCEDURE — 99213 OFFICE O/P EST LOW 20 MIN: CPT | Performed by: FAMILY MEDICINE

## 2017-03-27 RX ORDER — HYDROCHLOROTHIAZIDE 25 MG/1
25 TABLET ORAL DAILY
Qty: 90 TABLET | Refills: 1 | Status: SHIPPED | OUTPATIENT
Start: 2017-03-27 | End: 2018-02-07

## 2017-03-27 NOTE — PROGRESS NOTES
SUBJECTIVE:                                                    Sammi Manzano is a 67 year old female who presents to clinic today for the following health issues:      Hypertension Follow-up      Outpatient blood pressures are being checked at home.  Results are have been improving.    Low Salt Diet: low salt     Patient presents for follow up on blood pressure. At last visit, she was started on HCTZ due to many years of persistent BP. Today BP is improved with readings ranging from -170/70-90.          Problem list and histories reviewed & adjusted, as indicated.  Additional history: as documented    Patient Active Problem List   Diagnosis     Hyperlipidemia     Osteopenia     HYPERLIPIDEMIA LDL GOAL <160     Glucose intolerance (impaired glucose tolerance)     Family history of diabetes mellitus     HL (hearing loss)     Diverticulosis     Advanced directives, counseling/discussion     Overweight (BMI 25.0-29.9)     OA (osteoarthritis) of knee     Cough - postinfectious     Chronic cough     Past Surgical History:   Procedure Laterality Date     COLONOSCOPY N/A 2017    Procedure: COLONOSCOPY;  Surgeon: Lenin Ferreira MD;  Location: [facility]     TONSILLECTOMY      at younger age-?4-5       Social History   Substance Use Topics     Smoking status: Former Smoker     Years: 17.00     Types: Cigarettes     Quit date: 1986     Smokeless tobacco: Never Used     Alcohol use No     Family History   Problem Relation Age of Onset     DIABETES Mother      alive 88 in 2010,stroke in      Psychotic Disorder Mother      Dementia     Hypertension Mother      CANCER Father      liver ca     Family History Negative Maternal Grandmother      HEART DISEASE Maternal Grandfather      heart attack at age of ?AGE     CANCER Paternal Grandmother      not known details     Family History Negative Paternal Grandfather       from old age     Family History Negative Brother      alive 57     Breast  "Cancer No family hx of      Cancer - colorectal No family hx of            Reviewed and updated as needed this visit by clinical staff  Tobacco  Allergies       Reviewed and updated as needed this visit by Provider         ROS:  Constitutional, cardiovascular,systems are negative, except as otherwise noted.    OBJECTIVE:                                                    /85 (BP Location: Right arm, Patient Position: Chair, Cuff Size: Adult Large)  Pulse 67  Temp 98.2  F (36.8  C) (Oral)  Resp 16  Ht 5' 3\" (1.6 m)  Wt 178 lb (80.7 kg)  Breastfeeding? No  BMI 31.53 kg/m2  Body mass index is 31.53 kg/(m^2).  GENERAL: healthy, alert and no distress  RESP: lungs clear to auscultation - no rales, rhonchi or wheezes  CV: regular rate and rhythm, normal S1 S2, no S3 or S4, no murmur, click or rub, no peripheral edema and peripheral pulses strong    Diagnostic Test Results:  none      ASSESSMENT/PLAN:                                                        1. Benign essential hypertension  - will increase HCTZ dose to 25 mg. Patient to continue monitoring BP and lifestyle modification.   - hydrochlorothiazide (HYDRODIURIL) 25 MG tablet; Take 1 tablet (25 mg) by mouth daily  Dispense: 90 tablet; Refill: 1    See Patient Instructions    Pattie Little MD  [facility]    "

## 2017-03-27 NOTE — MR AVS SNAPSHOT
"              After Visit Summary   3/27/2017    Sammi Manzano    MRN: [id number]           Patient Information     Date Of Birth          1950        Visit Information        Provider Department      3/27/2017 7:00 AM Pattie Little MD [facility]        Today's Diagnoses     Benign essential hypertension    -  1      Care Instructions    Follow up in 4 months or sooner if needed  Continue with excellent job        Follow-ups after your visit        Who to contact     If you have questions or need follow up information about today's clinic visit or your schedule please contact [facility] directly at [phone].  Normal or non-critical lab and imaging results will be communicated to you by MyChart, letter or phone within 4 business days after the clinic has received the results. If you do not hear from us within 7 days, please contact the clinic through MyChart or phone. If you have a critical or abnormal lab result, we will notify you by phone as soon as possible.  Submit refill requests through [employer] or call your pharmacy and they will forward the refill request to us. Please allow 3 business days for your refill to be completed.          Additional Information About Your Visit        MyChart Information     [employer] lets you send messages to your doctor, view your test results, renew your prescriptions, schedule appointments and more. To sign up, go to [email or web address] . Click on \"Log in\" on the left side of the screen, which will take you to the Welcome page. Then click on \"Sign up Now\" on the right side of the page.     You will be asked to enter the access code listed below, as well as some personal information. Please follow the directions to create your username and password.     Your access code is: [email or web address]  Expires: 2017  8:58 AM     Your access code will  in 90 days. If you need help or a new code, please call your " "[facility] or [phone].        Care EveryWhere ID     This is your Care EveryWhere ID. This could be used by other organizations to access your [place] medical records  [id number]        Your Vitals Were     Pulse Temperature Respirations Height Breastfeeding? BMI (Body Mass Index)    67 98.2  F (36.8  C) (Oral) 16 5' 3\" (1.6 m) No 31.53 kg/m2       Blood Pressure from Last 3 Encounters:   03/27/17 145/85   02/22/17 123/83   01/31/17 169/90    Weight from Last 3 Encounters:   03/27/17 178 lb (80.7 kg)   02/22/17 174 lb (78.9 kg)   01/31/17 174 lb 11.2 oz (79.2 kg)              Today, you had the following     No orders found for display         Today's Medication Changes          These changes are accurate as of: 3/27/17  7:29 AM.  If you have any questions, ask your nurse or doctor.               These medicines have changed or have updated prescriptions.        Dose/Directions    * hydrochlorothiazide 12.5 MG Tabs tablet   This may have changed:  Another medication with the same name was added. Make sure you understand how and when to take each.   Used for:  Benign essential hypertension   Changed by:  Pattie Little MD        Dose:  12.5 mg   Take 1 tablet (12.5 mg) by mouth daily   Quantity:  30 tablet   Refills:  1       * hydrochlorothiazide 25 MG tablet   Commonly known as:  HYDRODIURIL   This may have changed:  You were already taking a medication with the same name, and this prescription was added. Make sure you understand how and when to take each.   Used for:  Benign essential hypertension   Changed by:  Pattie Little MD        Dose:  25 mg   Take 1 tablet (25 mg) by mouth daily   Quantity:  90 tablet   Refills:  1       * Notice:  This list has 2 medication(s) that are the same as other medications prescribed for you. Read the directions carefully, and ask your doctor or other care provider to review them with you.         Where to get your medicines      These " medications were sent to [id number] - [place], [place] - [address]  [address], [facility] [address]     Phone:  [phone]     hydrochlorothiazide 25 MG tablet                Primary Care Provider Office Phone # Fax #    Pattie Nohemy Little [phone]       [facility] [address]  [facility] [address]        Thank you!     Thank you for choosing [facility]  for your care. Our goal is always to provide you with excellent care. Hearing back from our patients is one way we can continue to improve our services. Please take a few minutes to complete the written survey that you may receive in the mail after your visit with us. Thank you!             Your Updated Medication List - Protect others around you: Learn how to safely use, store and throw away your medicines at www.disposemymeds.org.          This list is accurate as of: 3/27/17  7:29 AM.  Always use your most recent med list.                   Brand Name Dispense Instructions for use    ASPIRIN PO      Take 81 mg by mouth       CALCIUM 600 + D 600-200 MG-UNIT Tabs     3 MONTHS    2 q d       * hydrochlorothiazide 12.5 MG Tabs tablet     30 tablet    Take 1 tablet (12.5 mg) by mouth daily       * hydrochlorothiazide 25 MG tablet    HYDRODIURIL    90 tablet    Take 1 tablet (25 mg) by mouth daily       * Notice:  This list has 2 medication(s) that are the same as other medications prescribed for you. Read the directions carefully, and ask your doctor or other care provider to review them with you.

## 2017-03-27 NOTE — NURSING NOTE
"Chief Complaint   Patient presents with     Hypertension     f/u meds, BP check       Initial /85 (BP Location: Right arm, Patient Position: Chair, Cuff Size: Adult Large)  Pulse 67  Temp 98.2  F (36.8  C) (Oral)  Resp 16  Ht 5' 3\" (1.6 m)  Wt 178 lb (80.7 kg)  Breastfeeding? No  BMI 31.53 kg/m2 Estimated body mass index is 31.53 kg/(m^2) as calculated from the following:    Height as of this encounter: 5' 3\" (1.6 m).    Weight as of this encounter: 178 lb (80.7 kg).  Medication Reconciliation: complete     Aimee Olivares/[facility]---[facility]      "

## 2018-02-03 ENCOUNTER — RADIANT APPOINTMENT (OUTPATIENT)
Dept: MAMMOGRAPHY | Facility: CLINIC | Age: 68
End: 2018-02-03
Attending: FAMILY MEDICINE
Payer: COMMERCIAL

## 2018-02-03 DIAGNOSIS — Z12.31 VISIT FOR SCREENING MAMMOGRAM: ICD-10-CM

## 2018-02-03 PROCEDURE — 77067 SCR MAMMO BI INCL CAD: CPT | Mod: TC

## 2018-02-07 ENCOUNTER — OFFICE VISIT (OUTPATIENT)
Dept: FAMILY MEDICINE | Facility: CLINIC | Age: 68
End: 2018-02-07
Payer: COMMERCIAL

## 2018-02-07 VITALS
WEIGHT: 173 LBS | OXYGEN SATURATION: 98 % | DIASTOLIC BLOOD PRESSURE: 82 MMHG | BODY MASS INDEX: 30.65 KG/M2 | HEART RATE: 65 BPM | SYSTOLIC BLOOD PRESSURE: 140 MMHG | HEIGHT: 63 IN | TEMPERATURE: 98.2 F | RESPIRATION RATE: 12 BRPM

## 2018-02-07 DIAGNOSIS — R73.02 GLUCOSE INTOLERANCE (IMPAIRED GLUCOSE TOLERANCE): ICD-10-CM

## 2018-02-07 DIAGNOSIS — M85.80 OSTEOPENIA, UNSPECIFIED LOCATION: ICD-10-CM

## 2018-02-07 DIAGNOSIS — Z00.00 MEDICARE ANNUAL WELLNESS VISIT, SUBSEQUENT: Primary | ICD-10-CM

## 2018-02-07 DIAGNOSIS — I10 BENIGN ESSENTIAL HYPERTENSION: ICD-10-CM

## 2018-02-07 DIAGNOSIS — E78.5 HYPERLIPIDEMIA, UNSPECIFIED HYPERLIPIDEMIA TYPE: ICD-10-CM

## 2018-02-07 LAB
ALBUMIN SERPL-MCNC: 4 G/DL (ref 3.4–5)
ALP SERPL-CCNC: 66 U/L (ref 40–150)
ALT SERPL W P-5'-P-CCNC: 34 U/L (ref 0–50)
ANION GAP SERPL CALCULATED.3IONS-SCNC: 5 MMOL/L (ref 3–14)
AST SERPL W P-5'-P-CCNC: 21 U/L (ref 0–45)
BILIRUB SERPL-MCNC: 0.6 MG/DL (ref 0.2–1.3)
BUN SERPL-MCNC: 14 MG/DL (ref 7–30)
CALCIUM SERPL-MCNC: 9.2 MG/DL (ref 8.5–10.1)
CHLORIDE SERPL-SCNC: 104 MMOL/L (ref 94–109)
CHOLEST SERPL-MCNC: 215 MG/DL
CO2 SERPL-SCNC: 29 MMOL/L (ref 20–32)
CREAT SERPL-MCNC: 0.7 MG/DL (ref 0.52–1.04)
CREAT UR-MCNC: 19 MG/DL
DEPRECATED CALCIDIOL+CALCIFEROL SERPL-MC: 47 UG/L (ref 20–75)
GFR SERPL CREATININE-BSD FRML MDRD: 83 ML/MIN/1.7M2
GLUCOSE SERPL-MCNC: 114 MG/DL (ref 70–99)
HBA1C MFR BLD: 5.9 % (ref 4.3–6)
HDLC SERPL-MCNC: 68 MG/DL
LDLC SERPL CALC-MCNC: 125 MG/DL
MICROALBUMIN UR-MCNC: <5 MG/L
MICROALBUMIN/CREAT UR: NORMAL MG/G CR (ref 0–25)
NONHDLC SERPL-MCNC: 147 MG/DL
POTASSIUM SERPL-SCNC: 4 MMOL/L (ref 3.4–5.3)
PROT SERPL-MCNC: 7.1 G/DL (ref 6.8–8.8)
SODIUM SERPL-SCNC: 138 MMOL/L (ref 133–144)
TRIGL SERPL-MCNC: 109 MG/DL

## 2018-02-07 PROCEDURE — 83036 HEMOGLOBIN GLYCOSYLATED A1C: CPT | Performed by: FAMILY MEDICINE

## 2018-02-07 PROCEDURE — 80061 LIPID PANEL: CPT | Performed by: FAMILY MEDICINE

## 2018-02-07 PROCEDURE — 80053 COMPREHEN METABOLIC PANEL: CPT | Performed by: FAMILY MEDICINE

## 2018-02-07 PROCEDURE — 82306 VITAMIN D 25 HYDROXY: CPT | Performed by: FAMILY MEDICINE

## 2018-02-07 PROCEDURE — 36415 COLL VENOUS BLD VENIPUNCTURE: CPT | Performed by: FAMILY MEDICINE

## 2018-02-07 PROCEDURE — G0438 PPPS, INITIAL VISIT: HCPCS | Performed by: FAMILY MEDICINE

## 2018-02-07 PROCEDURE — 82043 UR ALBUMIN QUANTITATIVE: CPT | Performed by: FAMILY MEDICINE

## 2018-02-07 RX ORDER — LISINOPRIL 20 MG/1
20 TABLET ORAL DAILY
Qty: 90 TABLET | Refills: 1 | Status: SHIPPED | OUTPATIENT
Start: 2018-02-07 | End: 2018-04-24

## 2018-02-07 NOTE — MR AVS SNAPSHOT
After Visit Summary   2/7/2018    Sammi Manzano    MRN: [id number]           Patient Information     Date Of Birth          1950        Visit Information        Provider Department      2/7/2018 7:00 AM Carolyn Steele,  [facility]        Today's Diagnoses     Medicare annual wellness visit, subsequent    -  1    Benign essential hypertension        Hyperlipidemia, unspecified hyperlipidemia type        Osteopenia, unspecified location        Glucose intolerance (impaired glucose tolerance)          Care Instructions      Preventive Health Recommendations  Female Ages 65 +    Yearly exam:     See your health care provider every year in order to  o Review health changes.   o Discuss preventive care.    o Review your medicines if your doctor has prescribed any.      You no longer need a yearly Pap test unless you've had an abnormal Pap test in the past 10 years. If you have vaginal symptoms, such as bleeding or discharge, be sure to talk with your provider about a Pap test.      Every 1 to 2 years, have a mammogram.  If you are over 69, talk with your health care provider about whether or not you want to continue having screening mammograms.      Every 10 years, have a colonoscopy. Or, have a yearly FIT test (stool test). These exams will check for colon cancer.       Have a cholesterol test every 5 years, or more often if your doctor advises it.       Have a diabetes test (fasting glucose) every three years. If you are at risk for diabetes, you should have this test more often.       At age 65, have a bone density scan (DEXA) to check for osteoporosis (brittle bone disease).    Shots:    Get a flu shot each year.    Get a tetanus shot every 10 years.    Talk to your doctor about your pneumonia vaccines. There are now two you should receive - Pneumovax (PPSV 23) and Prevnar (PCV 13).    Talk to your doctor about the shingles vaccine.    Talk to your doctor about  "the hepatitis B vaccine.    Nutrition:     Eat at least 5 servings of fruits and vegetables each day.      Eat whole-grain bread, whole-wheat pasta and brown rice instead of white grains and rice.      Talk to your provider about Calcium and Vitamin D.     Lifestyle    Exercise at least 150 minutes a week (30 minutes a day, 5 days a week). This will help you control your weight and prevent disease.      Limit alcohol to one drink per day.      No smoking.       Wear sunscreen to prevent skin cancer.       See your dentist twice a year for an exam and cleaning.      See your eye doctor every 1 to 2 years to screen for conditions such as glaucoma, macular degeneration, cataracts, etc           Follow-ups after your visit        Who to contact     If you have questions or need follow up information about today's clinic visit or your schedule please contact [facility] directly at [phone].  Normal or non-critical lab and imaging results will be communicated to you by MyChart, letter or phone within 4 business days after the clinic has received the results. If you do not hear from us within 7 days, please contact the clinic through [employer] or phone. If you have a critical or abnormal lab result, we will notify you by phone as soon as possible.  Submit refill requests through [employer] or call your pharmacy and they will forward the refill request to us. Please allow 3 business days for your refill to be completed.          Additional Information About Your Visit        [employer] Information     [employer] lets you send messages to your doctor, view your test results, renew your prescriptions, schedule appointments and more. To sign up, go to [email or web address] . Click on \"Log in\" on the left side of the screen, which will take you to the Welcome page. Then click on \"Sign up Now\" on the right side of the page.     You will be asked to enter the access code listed below, as well as some personal " "information. Please follow the directions to create your username and password.     Your access code is: [email or web address]  Expires: 2018  8:05 AM     Your access code will  in 90 days. If you need help or a new code, please call your [facility] clinic or [phone].        Care EveryWhere ID     This is your Care EveryWhere ID. This could be used by other organizations to access your [facility] medical records  [id number]        Your Vitals Were     Pulse Temperature Respirations Height Pulse Oximetry BMI (Body Mass Index)    65 98.2  F (36.8  C) (Oral) 12 5' 3\" (1.6 m) 98% 30.65 kg/m2       Blood Pressure from Last 3 Encounters:   18 140/82   17 145/85   17 123/83    Weight from Last 3 Encounters:   18 173 lb (78.5 kg)   17 178 lb (80.7 kg)   17 174 lb (78.9 kg)              We Performed the Following     Albumin Random Urine Quantitative with Creat Ratio     Comprehensive metabolic panel     Hemoglobin A1c     Lipid panel reflex to direct LDL Fasting     Vitamin D Deficiency          Today's Medication Changes          These changes are accurate as of 18  8:05 AM.  If you have any questions, ask your nurse or doctor.               Start taking these medicines.        Dose/Directions    lisinopril 20 MG tablet   Commonly known as:  PRINIVIL/ZESTRIL   Used for:  Benign essential hypertension   Started by:  Carolyn Steele DO        Dose:  20 mg   Take 1 tablet (20 mg) by mouth daily   Quantity:  90 tablet   Refills:  1         Stop taking these medicines if you haven't already. Please contact your care team if you have questions.     hydrochlorothiazide 12.5 MG Tabs tablet   Stopped by:  Carolyn Steele DO           hydrochlorothiazide 25 MG tablet   Commonly known as:  HYDRODIURIL   Stopped by:  Carolyn Steele DO                Where to get your medicines      These medications were sent to [id number] [address]   " [address], [facility] [address]     Phone:  [phone]     lisinopril 20 MG tablet                Primary Care Provider Office Phone # Fax #    Pattie Little [phone] [address]  [facility] [address]        Equal Access to Services     [facility] : Hadii aad ku hadasho Soomaali, waaxda luqadaha, qaybta kaalmada adeegyada, waxay idiin hayaan adeeg khhaiwen lakileyn . So [facility] [phone].    ATENCIÓN: Si habla español, tiene a nava disposición servicios gratuitos de asistencia lingüística. Llame al [phone].    We comply with applicable federal civil rights laws and [place] laws. We do not discriminate on the basis of race, color, national origin, age, disability, sex, sexual orientation, or gender identity.            Thank you!     Thank you for choosing [facility]  for your care. Our goal is always to provide you with excellent care. Hearing back from our patients is one way we can continue to improve our services. Please take a few minutes to complete the written survey that you may receive in the mail after your visit with us. Thank you!             Your Updated Medication List - Protect others around you: Learn how to safely use, store and throw away your medicines at www.disposemymeds.org.          This list is accurate as of 2/7/18  8:05 AM.  Always use your most recent med list.                   Brand Name Dispense Instructions for use Diagnosis    ASPIRIN PO      Take 81 mg by mouth        CALCIUM 600 + D 600-200 MG-UNIT Tabs     3 MONTHS    2 q d        lisinopril 20 MG tablet    PRINIVIL/ZESTRIL    90 tablet    Take 1 tablet (20 mg) by mouth daily    Benign essential hypertension

## 2018-02-07 NOTE — PROGRESS NOTES
SUBJECTIVE:   Sammi Manzano is a 67 year old female who presents for Preventive Visit.  Are you in the first 12 months of your Medicare Part B coverage?  No    Healthy Habits:    Do you get at least three servings of calcium containing foods daily (dairy, green leafy vegetables, etc.)? yes    Amount of exercise or daily activities, outside of work: none    Problems taking medications regularly Yes HTZ-diarrhea and blurred vision went off in June     Medication side effects: Yes diarrhea and blurred vision    Have you had an eye exam in the past two years? yes    Do you see a dentist twice per year? yes    Do you have sleep apnea, excessive snoring or daytime drowsiness?no      Ability to successfully perform activities of daily living: Yes, no assistance needed    Home safety:  none identified     Hearing impairment: Yes, Difficulty following a conversation in a noisy restaurant or crowded room.    Feel that people are mumbling or not speaking clearly.    Difficulty following dialogue in the theater.    Difficult to understand a speaker at a public meeting or [employer] service.    Need to ask people to speak up or repeat themselves.    Difficulty understanding soft or whispered speech.    Difficulty understanding speech on the telephone.    Fall risk:  Fallen 2 or more times in the past year?: No  Any fall with injury in the past year?: No        COGNITIVE SCREEN  1) Repeat 3 items (Banana, Sunrise, Chair)    2) Clock draw: NORMAL  3) 3 item recall: Recalls 3 objects  Results: 3 items recalled: COGNITIVE IMPAIRMENT LESS LIKELY    Mini-CogTM Copyright S Oscar. Licensed by the author for use in [facility]; reprinted with permission (john@.[facility]). All rights reserved.            Hypertension Follow-up      Outpatient blood pressures are being checked at [facility].  Results are 140-175/.    Low Salt Diet: no added salt      Reviewed and updated as needed this visit by clinical  staff  Tobacco  Allergies  Meds  Med Hx  Surg Hx  Fam Hx  Soc Hx        Reviewed and updated as needed this visit by Provider        Social History   Substance Use Topics     Smoking status: Former Smoker     Years: 17.00     Types: Cigarettes     Quit date: 1/1/1986     Smokeless tobacco: Never Used     Alcohol use No       If you drink alcohol do you typically have >3 drinks per day or >7 drinks per week? No                        Today's PHQ-2 Score:   PHQ-2 ( 1999 Pfizer) 2/7/2018 3/27/2017   Q1: Little interest or pleasure in doing things 0 0   Q2: Feeling down, depressed or hopeless 0 0   PHQ-2 Score 0 0       Do you feel safe in your environment - Yes    Do you have a Health Care Directive?: No: Advance care planning reviewed with patient; information given to patient to review.    Current providers sharing in care for this patient include:   Patient Care Team:  Pattie Little MD as PCP - General (Family Practice)    The following health maintenance items are reviewed in Epic and correct as of today:  Health Maintenance   Topic Date Due     ADVANCE DIRECTIVE PLANNING Q5 YRS  03/29/2017     MICROALBUMIN Q1 YEAR  01/31/2018     FALL RISK ASSESSMENT  01/31/2018     MAMMO Q1 YR  02/03/2019     DEXA Q3 YR  03/01/2020     LIPID MONITORING Q5 YEARS  03/03/2020     COLONOSCOPY Q10 YR  02/22/2027     TETANUS Q10 YR  03/16/2027     INFLUENZA VACCINE (SYSTEM ASSIGNED)  Completed     PNEUMOCOCCAL  Completed     HEPATITIS C SCREENING  Completed     Patient Active Problem List   Diagnosis     Hyperlipidemia     Osteopenia     HYPERLIPIDEMIA LDL GOAL <160     Glucose intolerance (impaired glucose tolerance)     Family history of diabetes mellitus     HL (hearing loss)     Diverticulosis     Advanced directives, counseling/discussion     Overweight (BMI 25.0-29.9)     OA (osteoarthritis) of knee     Cough - postinfectious     Chronic cough     Benign essential hypertension     Past Surgical History:  "  Procedure Laterality Date     COLONOSCOPY N/A 2017    Procedure: COLONOSCOPY;  Surgeon: Lenin Ferreira MD;  Location: [facility]     TONSILLECTOMY      at younger age-?4-5       Social History   Substance Use Topics     Smoking status: Former Smoker     Years: 17.00     Types: Cigarettes     Quit date: 1986     Smokeless tobacco: Never Used     Alcohol use No     Family History   Problem Relation Age of Onset     DIABETES Mother      alive 88 in 2010,stroke in      Psychotic Disorder Mother      Dementia     Hypertension Mother      CANCER Father      liver ca     Family History Negative Maternal Grandmother      HEART DISEASE Maternal Grandfather      heart attack at age of ?AGE     CANCER Paternal Grandmother      not known details     Family History Negative Paternal Grandfather       from old age     Family History Negative Brother      alive 57     Breast Cancer No family hx of      Cancer - colorectal No family hx of            Pneumonia Vaccine: Both vaccines completed  Mammogram Screening: Patient over age 50, mutual decision to screen reflected in health maintenance.  History of abnormal Pap smear: NO - age 65 - see link Cervical Cytology Screening Guidelines    ROS:  Constitutional, HEENT, cardiovascular, pulmonary, gi and gu systems are negative, except as otherwise noted.    OBJECTIVE:   /82 (BP Location: Right arm, Patient Position: Chair, Cuff Size: Adult Regular)  Pulse 65  Temp 98.2  F (36.8  C) (Oral)  Resp 12  Ht 5' 3\" (1.6 m)  Wt 173 lb (78.5 kg)  SpO2 98%  BMI 30.65 kg/m2 Estimated body mass index is 30.65 kg/(m^2) as calculated from the following:    Height as of this encounter: 5' 3\" (1.6 m).    Weight as of this encounter: 173 lb (78.5 kg).  EXAM:   GENERAL APPEARANCE: healthy, alert and no distress  EYES: Eyes grossly normal to inspection, PERRL and conjunctivae and sclerae normal  HENT: ear canals and TM's normal, nose and mouth without ulcers or lesions, " "oropharynx clear and oral mucous membranes moist  NECK: no adenopathy, no asymmetry, masses, or scars and thyroid normal to palpation  RESP: lungs clear to auscultation - no rales, rhonchi or wheezes  CV: regular rate and rhythm, normal S1 S2, no S3 or S4, no murmur, click or rub, no peripheral edema and peripheral pulses strong  ABDOMEN: soft, nontender, no hepatosplenomegaly, no masses and bowel sounds normal  MS: no musculoskeletal defects are noted and gait is age appropriate without ataxia  SKIN: no suspicious lesions or rashes  NEURO: Normal strength and tone, sensory exam grossly normal, mentation intact and speech normal  PSYCH: mentation appears normal and affect normal/bright    ASSESSMENT / PLAN:   1. Medicare annual wellness visit, subsequent    2. Benign essential hypertension  - Uncontrolled HTN  - Stopped HCTZ due to side effects of blurry vision  - Will start lisinopril  - Recommended pharmacy or RN BP follow up in 1 month   - lisinopril (PRINIVIL/ZESTRIL) 20 MG tablet; Take 1 tablet (20 mg) by mouth daily  Dispense: 90 tablet; Refill: 1    3. Hyperlipidemia, unspecified hyperlipidemia type  - Lipid panel reflex to direct LDL Fasting  - Comprehensive metabolic panel    4. Osteopenia, unspecified location  - Vitamin D Deficiency    5. Glucose intolerance (impaired glucose tolerance)  - Hemoglobin A1c  - Albumin Random Urine Quantitative with Creat Ratio    End of Life Planning:  Patient currently has an advanced directive: No.  I have verified the patient's ablity to prepare an advanced directive/make health care decisions.  Literature was provided to assist patient in preparing an advanced directive.    COUNSELING:  Reviewed preventive health counseling, as reflected in patient instructions        Estimated body mass index is 30.65 kg/(m^2) as calculated from the following:    Height as of this encounter: 5' 3\" (1.6 m).    Weight as of this encounter: 173 lb (78.5 kg).  Weight management plan: " Discussed healthy diet and exercise guidelines and patient will follow up in 12 months in clinic to re-evaluate.     reports that she quit smoking about 32 years ago. Her smoking use included Cigarettes. She quit after 17.00 years of use. She has never used smokeless tobacco.      Appropriate preventive services were discussed with this patient, including applicable screening as appropriate for cardiovascular disease, diabetes, osteopenia/osteoporosis, and glaucoma.  As appropriate for age/gender, discussed screening for colorectal cancer, prostate cancer, breast cancer, and cervical cancer. Checklist reviewing preventive services available has been given to the patient.    Reviewed patients plan of care and provided an AVS. The Basic Care Plan (routine screening as documented in Health Maintenance) for Sammi meets the Care Plan requirement. This Care Plan has been established and reviewed with the Patient.    Counseling Resources:  ATP IV Guidelines  Pooled Cohorts Equation Calculator  Breast Cancer Risk Calculator  FRAX Risk Assessment  ICSI Preventive Guidelines  Dietary Guidelines for Americans, 2010  USDA's MyPlate  ASA Prophylaxis  Lung CA Screening    Carolyn Steele DO  [facility]

## 2018-02-07 NOTE — LETTER
February 8, 2018      Sammi Krishna [place]  [address]  [facility] [address]        Dear Ms.Lindberg Manzano,    1) Vitamin D levels are normal   2) Urine protein levels are normal   3) Glucose and A1C are still a bit high.  She still has a diagnosis of pre-diabetes, but not full blown diabetes yet   4) Cholesterol is elevated.  She should consider starting a cholesterol medication to lower her risk of heart attack and stroke.     The 10-year ASCVD risk score (La Feriaalan MCKEE Jr, et al., 2013) is: 10.5%     Values used to calculate the score:       Age: 67 years       Sex: Female       Is Non- : No       Diabetic: No       Tobacco smoker: No       Systolic Blood Pressure: 140 mmHg       Is BP treated: Yes       HDL Cholesterol: 68 mg/dL       Total Cholesterol: 215 mg/dL    Resulted Orders   Lipid panel reflex to direct LDL Fasting   Result Value Ref Range    Cholesterol 215 (H) <200 mg/dL      Comment:      Desirable:       <200 mg/dl    Triglycerides 109 <150 mg/dL    HDL Cholesterol 68 >49 mg/dL    LDL Cholesterol Calculated 125 (H) <100 mg/dL      Comment:      Above desirable:  100-129 mg/dl  Borderline High:  130-159 mg/dL  High:             160-189 mg/dL  Very high:       >189 mg/dl      Non HDL Cholesterol 147 (H) <130 mg/dL      Comment:      Above Desirable:  130-159 mg/dl  Borderline high:  160-189 mg/dl  High:             190-219 mg/dl  Very high:       >219 mg/dl     Comprehensive metabolic panel   Result Value Ref Range    Sodium 138 133 - 144 mmol/L    Potassium 4.0 3.4 - 5.3 mmol/L    Chloride 104 94 - 109 mmol/L    Carbon Dioxide 29 20 - 32 mmol/L    Anion Gap 5 3 - 14 mmol/L    Glucose 114 (H) 70 - 99 mg/dL    Urea Nitrogen 14 7 - 30 mg/dL    Creatinine 0.70 0.52 - 1.04 mg/dL    GFR Estimate 83 >60 mL/min/1.7m2      Comment:      Non  GFR Calc    GFR Estimate If Black >90 >60 mL/min/1.7m2      Comment:       GFR Calc    Calcium 9.2 8.5 -  10.1 mg/dL    Bilirubin Total 0.6 0.2 - 1.3 mg/dL    Albumin 4.0 3.4 - 5.0 g/dL    Protein Total 7.1 6.8 - 8.8 g/dL    Alkaline Phosphatase 66 40 - 150 U/L    ALT 34 0 - 50 U/L    AST 21 0 - 45 U/L   Hemoglobin A1c   Result Value Ref Range    Hemoglobin A1C 5.9 4.3 - 6.0 %   Albumin Random Urine Quantitative with Creat Ratio   Result Value Ref Range    Creatinine Urine 19 mg/dL    Albumin Urine mg/L <5 mg/L    Albumin Urine mg/g Cr Unable to calculate due to low value 0 - 25 mg/g Cr   Vitamin D Deficiency   Result Value Ref Range    Vitamin D Deficiency screening 47 20 - 75 ug/L      Comment:      Season, race, dietary intake, and treatment affect the concentration of   25-hydroxy-Vitamin D. Values may decrease during winter months and increase   during summer months. Values 20-29 ug/L may indicate Vitamin D insufficiency   and values <20 ug/L may indicate Vitamin D deficiency.  Vitamin D determination is routinely performed by an immunoassay specific for   25 hydroxyvitamin D3.  If an individual is on vitamin D2 (ergocalciferol)   supplementation, please specify 25 OH vitamin D2 and D3 level determination by   LCMSMS test [id number].         If you have any questions or concerns, please call the clinic at the number listed above.       Sincerely,        Carolyn Steele, DO

## 2018-02-08 NOTE — PROGRESS NOTES
Please let pt know the following regarding her lab results:  1) Vitamin D levels are normal  2) Urine protein levels are normal  3) Glucose and A1C are still a bit high.  She still has a diagnosis of pre-diabetes, but not full blown diabetes yet  4) Cholesterol is elevated.  She should consider starting a cholesterol medication to lower her risk of heart attack and stroke.    The 10-year ASCVD risk score (Lauren MCKEE Jr, et al., 2013) is: 10.5%    Values used to calculate the score:      Age: 67 years      Sex: Female      Is Non- : No      Diabetic: No      Tobacco smoker: No      Systolic Blood Pressure: 140 mmHg      Is BP treated: Yes      HDL Cholesterol: 68 mg/dL      Total Cholesterol: 215 mg/dL

## 2018-04-24 ENCOUNTER — ALLIED HEALTH/NURSE VISIT (OUTPATIENT)
Dept: NURSING | Facility: CLINIC | Age: 68
End: 2018-04-24
Payer: COMMERCIAL

## 2018-04-24 VITALS — HEART RATE: 68 BPM | SYSTOLIC BLOOD PRESSURE: 138 MMHG | DIASTOLIC BLOOD PRESSURE: 88 MMHG

## 2018-04-24 DIAGNOSIS — I10 BENIGN ESSENTIAL HYPERTENSION: ICD-10-CM

## 2018-04-24 PROCEDURE — 99207 ZZC NO CHARGE NURSE ONLY: CPT

## 2018-04-24 RX ORDER — LISINOPRIL 20 MG/1
20 TABLET ORAL DAILY
Qty: 90 TABLET | Refills: 1 | Status: SHIPPED | OUTPATIENT
Start: 2018-04-24 | End: 2018-10-15

## 2018-04-24 NOTE — NURSING NOTE
Sammi Manzano is a 68 year old female who comes in today for a Blood Pressure check because of medication change.      Vitals as recorded, a large cuff was used.    Patient is taking medication as prescribed  Patient is tolerating medications well.  Patient is monitoring Blood Pressure at home.  Average readings if yes are 150/90    Current complaints: none    Disposition: follow-up as indicated by MD/AP and continue with  Medication as prescribed.    Adeline Cao CMA on 4/24/2018 at 9:14 AM

## 2018-04-24 NOTE — PROGRESS NOTES
Lisinopril refill sent, pt here for BP f/u  Prescription approved per [facility] Refill Protocol.  Niharika Barker RN, BSN

## 2018-04-24 NOTE — MR AVS SNAPSHOT
"              After Visit Summary   2018    Sammi Manzano    MRN: [id number]           Patient Information     Date Of Birth          1950        Visit Information        Provider Department      2018 9:00 AM JAREN BROWN/LPN [facility]        Today's Diagnoses     Benign essential hypertension           Follow-ups after your visit        Who to contact     If you have questions or need follow up information about today's clinic visit or your schedule please contact [facility] directly at [phone].  Normal or non-critical lab and imaging results will be communicated to you by MyChart, letter or phone within 4 business days after the clinic has received the results. If you do not hear from us within 7 days, please contact the clinic through [employer] or phone. If you have a critical or abnormal lab result, we will notify you by phone as soon as possible.  Submit refill requests through [employer] or call your pharmacy and they will forward the refill request to us. Please allow 3 business days for your refill to be completed.          Additional Information About Your Visit        MyChart Information     [employer] lets you send messages to your doctor, view your test results, renew your prescriptions, schedule appointments and more. To sign up, go to [email or web address] . Click on \"Log in\" on the left side of the screen, which will take you to the Welcome page. Then click on \"Sign up Now\" on the right side of the page.     You will be asked to enter the access code listed below, as well as some personal information. Please follow the directions to create your username and password.     Your access code is: [email or web address]  Expires: 2018  9:05 AM     Your access code will  in 90 days. If you need help or a new code, please call your [facility] or [phone].        Care EveryWhere ID     This is your Care EveryWhere ID. This could be used by other " organizations to access your [place] medical records  [id number]        Your Vitals Were     Pulse                   68            Blood Pressure from Last 3 Encounters:   04/24/18 138/88   02/07/18 140/82   03/27/17 145/85    Weight from Last 3 Encounters:   02/07/18 173 lb (78.5 kg)   03/27/17 178 lb (80.7 kg)   02/22/17 174 lb (78.9 kg)              Today, you had the following     No orders found for display         Where to get your medicines      These medications were sent to [id number] - [place], [place] - [address]  [address], [place] [address]     Phone:  [phone]     lisinopril 20 MG tablet          Primary Care Provider Office Phone # Fax #    Pattie Little [phone] [address] [facility] [address]        Equal Access to Services     GURWINDER SIEGEL : Hadii piper ku hadasho Soomaali, waaxda luqadaha, qaybta kaalmada adeegyada, abdiaziz miller haymyrna smith . So [facility] [phone].    ATENCIÓN: Si renela espbert, tiene a nava disposición servicios gratuitos de asistencia lingüística. Llame al [phone].    We comply with applicable federal civil rights laws and [place] laws. We do not discriminate on the basis of race, color, national origin, age, disability, sex, sexual orientation, or gender identity.            Thank you!     Thank you for choosing [facility]  for your care. Our goal is always to provide you with excellent care. Hearing back from our patients is one way we can continue to improve our services. Please take a few minutes to complete the written survey that you may receive in the mail after your visit with us. Thank you!             Your Updated Medication List - Protect others around you: Learn how to safely use, store and throw away your medicines at www.disposemymeds.org.          This list is accurate as of 4/24/18  9:18 AM.  Always use your most recent med list.                    Brand Name Dispense Instructions for use Diagnosis    ASPIRIN PO      Take 81 mg by mouth        CALCIUM 600 + D 600-200 MG-UNIT Tabs     3 MONTHS    2 q d        lisinopril 20 MG tablet    PRINIVIL/ZESTRIL    90 tablet    Take 1 tablet (20 mg) by mouth daily    Benign essential hypertension

## 2018-10-15 DIAGNOSIS — I10 BENIGN ESSENTIAL HYPERTENSION: ICD-10-CM

## 2018-10-15 NOTE — TELEPHONE ENCOUNTER
"Requested Prescriptions   Pending Prescriptions Disp Refills     lisinopril (PRINIVIL/ZESTRIL) 20 MG tablet [Pharmacy Med Name: Lisinopril Oral Tablet 20 MG] 90 tablet 0    Last Written Prescription Date:  4/24/18  Last Fill Quantity: 90,  # refills: 1   Last Office Visit: 2/7/2018 Cedric  Future Office Visit:      Sig: TAKE ONE TABLET BY MOUTH DAILY    ACE Inhibitors (Including Combos) Protocol Passed    10/15/2018  7:00 AM       Passed - Blood pressure under 140/90 in past 12 months    BP Readings from Last 3 Encounters:   04/24/18 138/88   02/07/18 140/82   03/27/17 145/85                Passed - Recent (12 mo) or future (30 days) visit within the authorizing provider's specialty    Patient had office visit in the last 12 months or has a visit in the next 30 days with authorizing provider or within the authorizing provider's specialty.  See \"Patient Info\" tab in inbasket, or \"Choose Columns\" in Meds & Orders section of the refill encounter.           Passed - Patient is age 18 or older       Passed - No active pregnancy on record       Passed - Normal serum creatinine on file in past 12 months    Recent Labs   Lab Test  02/07/18   0739   CR  0.70            Passed - Normal serum potassium on file in past 12 months    Recent Labs   Lab Test  02/07/18   0739   POTASSIUM  4.0            Passed - No positive pregnancy test in past 12 months          "

## 2018-10-15 NOTE — LETTER
October 16, 2018      Sammi Manzano  [address]  [facility] [address]        Dear Sammi,     We recently received a call from your pharmacy requesting a refill of your medication (lisinopril).    A review of your chart indicates that an appointment is required.  Please contact our office at [phone] to schedule your doctor's appointment for your 6 month visit.     We have authorized one refill of your medication to allow time for you to schedule your appointment.    Taking care of your health is important to us and ongoing visits with your provider are vital to your care.  We look forward to seeing you in the near future.    Sincerely,    Carolyn Steele D.O. /Niharika MERCHANT

## 2018-10-15 NOTE — TELEPHONE ENCOUNTER
"Requested Prescriptions   Pending Prescriptions Disp Refills     lisinopril (PRINIVIL/ZESTRIL) 20 MG tablet [Pharmacy Med Name: Lisinopril Oral Tablet 20 MG]  Last Written Prescription Date:  4/24/18  Last Fill Quantity: 90,  # refills: 1   Last office visit: 2/7/2018 with prescribing provider:  Anabel   Future Office Visit:     90 tablet 0     Sig: TAKE ONE TABLET BY MOUTH DAILY    ACE Inhibitors (Including Combos) Protocol Passed    10/15/2018  3:19 PM       Passed - Blood pressure under 140/90 in past 12 months    BP Readings from Last 3 Encounters:   04/24/18 138/88   02/07/18 140/82   03/27/17 145/85          Passed - Recent (12 mo) or future (30 days) visit within the authorizing provider's specialty    Patient had office visit in the last 12 months or has a visit in the next 30 days with authorizing provider or within the authorizing provider's specialty.  See \"Patient Info\" tab in inbasket, or \"Choose Columns\" in Meds & Orders section of the refill encounter.           Passed - Patient is age 18 or older       Passed - No active pregnancy on record       Passed - Normal serum creatinine on file in past 12 months           Passed - Normal serum potassium on file in past 12 months    Recent Labs   Lab Test  02/07/18   0739   POTASSIUM  4.0          Passed - No positive pregnancy test in past 12 months          "

## 2018-10-16 RX ORDER — LISINOPRIL 20 MG/1
TABLET ORAL
Qty: 90 TABLET | Refills: 0 | Status: SHIPPED | OUTPATIENT
Start: 2018-10-16 | End: 2019-01-30

## 2018-10-16 NOTE — TELEPHONE ENCOUNTER
6 month visit due, reminder letter sent  Prescription approved per [facility] Refill Protocol.  Niharika Barker RN, BSN

## 2018-11-06 ENCOUNTER — ALLIED HEALTH/NURSE VISIT (OUTPATIENT)
Dept: NURSING | Facility: CLINIC | Age: 68
End: 2018-11-06
Payer: COMMERCIAL

## 2018-11-06 VITALS — DIASTOLIC BLOOD PRESSURE: 88 MMHG | HEART RATE: 60 BPM | SYSTOLIC BLOOD PRESSURE: 138 MMHG

## 2018-11-06 DIAGNOSIS — Z01.30 BP CHECK: Primary | ICD-10-CM

## 2018-11-06 PROCEDURE — 99207 ZZC NO CHARGE NURSE ONLY: CPT

## 2018-11-06 NOTE — PROGRESS NOTES
Sammi Manzano is a 68 year old patient who comes in today for a Blood Pressure check.  Initial BP:  There were no vitals taken for this visit.     Data Unavailable  Disposition: follow-up as previously indicated by provider, results routed to MP Shari Schoenberger, ESTELLE

## 2018-11-06 NOTE — MR AVS SNAPSHOT
After Visit Summary   11/6/2018    Sammi Manzano    MRN: [id number]           Patient Information     Date Of Birth          1950        Visit Information        Provider Department      11/6/2018 9:00 AM JAREN BROWN/LPN [facility]        Today's Diagnoses     BP check    -  1       Follow-ups after your visit        Who to contact     If you have questions or need follow up information about today's clinic visit or your schedule please contact [facility] directly at [phone].  Normal or non-critical lab and imaging results will be communicated to you by MyChart, letter or phone within 4 business days after the clinic has received the results. If you do not hear from us within 7 days, please contact the clinic through MyChart or phone. If you have a critical or abnormal lab result, we will notify you by phone as soon as possible.  Submit refill requests through [employer] or call your pharmacy and they will forward the refill request to us. Please allow 3 business days for your refill to be completed.          Additional Information About Your Visit        Care EveryWhere ID     This is your Care EveryWhere ID. This could be used by other organizations to access your [place] medical records  [id number]        Your Vitals Were     Pulse                   60            Blood Pressure from Last 3 Encounters:   11/06/18 138/88   04/24/18 138/88   02/07/18 140/82    Weight from Last 3 Encounters:   02/07/18 173 lb (78.5 kg)   03/27/17 178 lb (80.7 kg)   02/22/17 174 lb (78.9 kg)              Today, you had the following     No orders found for display       Primary Care Provider Office Phone # Fax #    Pattie Little [phone]       [address] [facility] [address]        Equal Access to Services     GURWINDER SIEGEL : Hiram Rivera, meagan peraza, darrell velasco, abdiaziz harvey  mu smith ah. So [facility] [phone].    ATENCIÓN: Si habla vale, tiene a nava disposición servicios gratuitos de asistencia lingüística. Mati al [phone].    We comply with applicable federal civil rights laws and [place] laws. We do not discriminate on the basis of race, color, national origin, age, disability, sex, sexual orientation, or gender identity.            Thank you!     Thank you for choosing [facility]  for your care. Our goal is always to provide you with excellent care. Hearing back from our patients is one way we can continue to improve our services. Please take a few minutes to complete the written survey that you may receive in the mail after your visit with us. Thank you!             Your Updated Medication List - Protect others around you: Learn how to safely use, store and throw away your medicines at www.disposemymeds.org.          This list is accurate as of 11/6/18  9:06 AM.  Always use your most recent med list.                   Brand Name Dispense Instructions for use Diagnosis    ASPIRIN PO      Take 81 mg by mouth        CALCIUM 600 + D 600-200 MG-UNIT Tabs     3 MONTHS    2 q d        lisinopril 20 MG tablet    PRINIVIL/ZESTRIL    90 tablet    TAKE ONE TABLET BY MOUTH DAILY    Benign essential hypertension

## 2019-01-30 DIAGNOSIS — I10 BENIGN ESSENTIAL HYPERTENSION: ICD-10-CM

## 2019-01-30 NOTE — TELEPHONE ENCOUNTER
Requested Prescriptions   Pending Prescriptions Disp Refills     lisinopril (PRINIVIL/ZESTRIL) 20 MG tablet 90 tablet 0     Sig: Take 1 tablet (20 mg) by mouth daily    There is no refill protocol information for this order        Kenzie Carbajal  FWF - TC/FD  1/30/2019 8:42 AM      Last Written Prescription Date:  10/16/2018  Last Fill Quantity: 90,  # refills: 1   Last office visit: 2/7/2018 with prescribing provider:  Dr Steele   Future Office Visit:

## 2019-01-31 RX ORDER — LISINOPRIL 20 MG/1
20 TABLET ORAL DAILY
Qty: 30 TABLET | Refills: 0 | Status: SHIPPED | OUTPATIENT
Start: 2019-01-31 | End: 2019-02-11

## 2019-01-31 NOTE — TELEPHONE ENCOUNTER
Due for visit.  Also was seeing Dr. Steele.  Scheduled for 2/11/19 9:20 am with Dr. Little.  Was seening her previously.  Lindsay Munoz RN

## 2019-02-11 ENCOUNTER — OFFICE VISIT (OUTPATIENT)
Dept: FAMILY MEDICINE | Facility: CLINIC | Age: 69
End: 2019-02-11
Payer: COMMERCIAL

## 2019-02-11 VITALS
HEART RATE: 52 BPM | SYSTOLIC BLOOD PRESSURE: 136 MMHG | TEMPERATURE: 97.9 F | RESPIRATION RATE: 16 BRPM | HEIGHT: 63 IN | BODY MASS INDEX: 31.54 KG/M2 | WEIGHT: 178 LBS | DIASTOLIC BLOOD PRESSURE: 86 MMHG

## 2019-02-11 DIAGNOSIS — I10 BENIGN ESSENTIAL HYPERTENSION: ICD-10-CM

## 2019-02-11 DIAGNOSIS — Z00.00 MEDICARE ANNUAL WELLNESS VISIT, SUBSEQUENT: Primary | ICD-10-CM

## 2019-02-11 DIAGNOSIS — E78.5 HYPERLIPIDEMIA, UNSPECIFIED HYPERLIPIDEMIA TYPE: ICD-10-CM

## 2019-02-11 DIAGNOSIS — M85.80 OSTEOPENIA, UNSPECIFIED LOCATION: ICD-10-CM

## 2019-02-11 DIAGNOSIS — R73.02 GLUCOSE INTOLERANCE (IMPAIRED GLUCOSE TOLERANCE): ICD-10-CM

## 2019-02-11 LAB
ALBUMIN SERPL-MCNC: 4.3 G/DL (ref 3.4–5)
ALP SERPL-CCNC: 55 U/L (ref 40–150)
ALT SERPL W P-5'-P-CCNC: 41 U/L (ref 0–50)
ANION GAP SERPL CALCULATED.3IONS-SCNC: 6 MMOL/L (ref 3–14)
AST SERPL W P-5'-P-CCNC: 26 U/L (ref 0–45)
BILIRUB SERPL-MCNC: 0.6 MG/DL (ref 0.2–1.3)
BUN SERPL-MCNC: 14 MG/DL (ref 7–30)
CALCIUM SERPL-MCNC: 9.3 MG/DL (ref 8.5–10.1)
CHLORIDE SERPL-SCNC: 100 MMOL/L (ref 94–109)
CHOLEST SERPL-MCNC: 228 MG/DL
CO2 SERPL-SCNC: 27 MMOL/L (ref 20–32)
CREAT SERPL-MCNC: 0.75 MG/DL (ref 0.52–1.04)
GFR SERPL CREATININE-BSD FRML MDRD: 82 ML/MIN/{1.73_M2}
GLUCOSE SERPL-MCNC: 113 MG/DL (ref 70–99)
HBA1C MFR BLD: 5.7 % (ref 0–5.6)
HDLC SERPL-MCNC: 68 MG/DL
LDLC SERPL CALC-MCNC: 140 MG/DL
NONHDLC SERPL-MCNC: 160 MG/DL
POTASSIUM SERPL-SCNC: 4 MMOL/L (ref 3.4–5.3)
PROT SERPL-MCNC: 7.5 G/DL (ref 6.8–8.8)
SODIUM SERPL-SCNC: 133 MMOL/L (ref 133–144)
TRIGL SERPL-MCNC: 101 MG/DL

## 2019-02-11 PROCEDURE — 99397 PER PM REEVAL EST PAT 65+ YR: CPT | Performed by: FAMILY MEDICINE

## 2019-02-11 PROCEDURE — 83036 HEMOGLOBIN GLYCOSYLATED A1C: CPT | Performed by: FAMILY MEDICINE

## 2019-02-11 PROCEDURE — 82306 VITAMIN D 25 HYDROXY: CPT | Performed by: FAMILY MEDICINE

## 2019-02-11 PROCEDURE — 82043 UR ALBUMIN QUANTITATIVE: CPT | Performed by: FAMILY MEDICINE

## 2019-02-11 PROCEDURE — 80053 COMPREHEN METABOLIC PANEL: CPT | Performed by: FAMILY MEDICINE

## 2019-02-11 PROCEDURE — 36415 COLL VENOUS BLD VENIPUNCTURE: CPT | Performed by: FAMILY MEDICINE

## 2019-02-11 PROCEDURE — 80061 LIPID PANEL: CPT | Performed by: FAMILY MEDICINE

## 2019-02-11 RX ORDER — LISINOPRIL 20 MG/1
20 TABLET ORAL DAILY
Qty: 90 TABLET | Refills: 1 | Status: SHIPPED | OUTPATIENT
Start: 2019-02-11 | End: 2019-09-03

## 2019-02-11 ASSESSMENT — ENCOUNTER SYMPTOMS
HEMATOCHEZIA: 0
HEMATURIA: 0
NERVOUS/ANXIOUS: 0
FEVER: 0
EYE PAIN: 0
ABDOMINAL PAIN: 0
DIZZINESS: 0
DIARRHEA: 0
COUGH: 0
CONSTIPATION: 0
CHILLS: 0

## 2019-02-11 ASSESSMENT — ACTIVITIES OF DAILY LIVING (ADL): CURRENT_FUNCTION: NO ASSISTANCE NEEDED

## 2019-02-11 ASSESSMENT — MIFFLIN-ST. JEOR: SCORE: 1306.53

## 2019-02-11 NOTE — PATIENT INSTRUCTIONS
Preventive Health Recommendations    See your health care provider every year to    Review health changes.     Discuss preventive care.      Review your medicines if your doctor has prescribed any.      You no longer need a yearly Pap test unless you've had an abnormal Pap test in the past 10 years. If you have vaginal symptoms, such as bleeding or discharge, be sure to talk with your provider about a Pap test.      Every 1 to 2 years, have a mammogram.  If you are over 69, talk with your health care provider about whether or not you want to continue having screening mammograms.      Every 10 years, have a colonoscopy. Or, have a yearly FIT test (stool test). These exams will check for colon cancer.       Have a cholesterol test every 5 years, or more often if your doctor advises it.       Have a diabetes test (fasting glucose) every three years. If you are at risk for diabetes, you should have this test more often.       At age 65, have a bone density scan (DEXA) to check for osteoporosis (brittle bone disease).    Shots:    Get a flu shot each year.    Get a tetanus shot every 10 years.    Talk to your doctor about your pneumonia vaccines. There are now two you should receive - Pneumovax (PPSV 23) and Prevnar (PCV 13).    Talk to your pharmacist about the shingles vaccine.    Talk to your doctor about the hepatitis B vaccine.    Nutrition:     Eat at least 5 servings of fruits and vegetables each day.      Eat whole-grain bread, whole-wheat pasta and brown rice instead of white grains and rice.      Get adequate Calcium and Vitamin D.     Lifestyle    Exercise at least 150 minutes a week (30 minutes a day, 5 days a week). This will help you control your weight and prevent disease.      Limit alcohol to one drink per day.      No smoking.       Wear sunscreen to prevent skin cancer.       See your dentist twice a year for an exam and cleaning.      See your eye doctor every 1 to 2 years to screen for conditions  such as glaucoma, macular degeneration and cataracts.    Personalized Prevention Plan  You are due for the preventive services outlined below.  Your care team is available to assist you in scheduling these services.  If you have already completed any of these items, please share that information with your care team to update in your medical record.  Health Maintenance Due   Topic Date Due     Zoster (Shingles) Vaccine (1 of 2) 03/14/2000     Diptheria Tetanus Pertussis (DTAP/TDAP/TD) Vaccine (2 - Td) 02/12/2017     Discuss Advance Directive Planning  03/29/2017     Flu Vaccine (1) 09/01/2018     Microalbumin Lab - yearly  02/07/2019     Mammogram - yearly  02/03/2019     FALL RISK ASSESSMENT  02/07/2019     Depression Assessment 2 - yearly  02/07/2019

## 2019-02-11 NOTE — PROGRESS NOTES
"SUBJECTIVE:   Sammi Manzano is a 68 year old female who presents for Preventive Visit.      Are you in the first 12 months of your Medicare coverage?  No    Annual Wellness Visit     In general, how would you rate your overall health?  Excellent    Frequency of exercise:  2-3 days/week    Duration of exercise:  15-30 minutes    Do you usually eat at least 4 servings of fruit and vegetables a day, include whole grains    & fiber and avoid regularly eating high fat or \"junk\" foods?  Yes    Taking medications regularly:  Yes    Medication side effects:  None    Ability to successfully perform activities of daily living:  No assistance needed    Home Safety:  No safety concerns identified    Hearing Impairment:  Difficulty following a conversation in a noisy restaurant or crowded room, feel that people are mumbling or not speaking clearly, difficulty following dialogue in the theater, difficult to understand a speaker at a public meeting or Jewish service, need to ask people to speak up or repeat themselves, find that men's voices are easier to understand than woman's, difficulty understanding soft or whispered speech and difficulty understanding speech on the telephone    In the past 6 months, have you been bothered by leaking of urine?  No    In general, how would you rate your overall mental or emotional health?  Excellent    PHQ-2 Total Score: 0    Additional concerns today:  No    Do you feel safe in your environment? Yes    Do you have a Health Care Directive? No: Advance care planning reviewed with patient; information given to patient to review.      Fall risk       Cognitive Screening   1) Repeat 3 items (Leader, Season, Table)    2) Clock draw: NORMAL  3) 3 item recall: Recalls 3 objects  Results: 3 items recalled: COGNITIVE IMPAIRMENT LESS LIKELY    Mini-CogTM Copyright LOLA Moore. Licensed by the author for use in [facility]; reprinted with permission (john@.[facility]). All rights " reserved.      Do you have sleep apnea, excessive snoring or daytime drowsiness?: no    Reviewed and updated as needed this visit by clinical staff  Tobacco  Allergies  Meds  Fam Hx  Soc Hx        Reviewed and updated as needed this visit by Provider        Social History     Tobacco Use     Smoking status: Former Smoker     Years: 17.00     Types: Cigarettes     Last attempt to quit: 1986     Years since quittin.1     Smokeless tobacco: Never Used   Substance Use Topics     Alcohol use: No       Alcohol Use 2019   If you drink alcohol do you typically have greater than 3 drinks per day OR greater than 7 drinks per week? No   No flowsheet data found.            Current providers sharing in care for this patient include:   Patient Care Team:  Pattie Little MD as PCP - General (Family Practice)  Carolyn Steele DO as PCP - Assigned PCP    The following health maintenance items are reviewed in Epic and correct as of today:  Health Maintenance   Topic Date Due     ZOSTER IMMUNIZATION (1 of 2) 2000     DTAP/TDAP/TD IMMUNIZATION (2 - Td) 2017     ADVANCE DIRECTIVE PLANNING Q5 YRS  2017     INFLUENZA VACCINE (1) 2018     MICROALBUMIN Q1 YEAR  2019     MAMMO Q1 YR  2019     FALL RISK ASSESSMENT  2019     PHQ-2 Q1 YR  2019     DEXA Q3 YR  2020     LIPID SCREENING Q5 YEARS  2023     COLONOSCOPY Q10 YR  2027     PNEUMOCOCCAL IMMUNIZATION 65+ LOW/MEDIUM RISK  Completed     HEPATITIS C SCREENING  Completed     IPV IMMUNIZATION  Aged Out     MENINGITIS IMMUNIZATION  Aged Out     Labs reviewed in [facility]  Mammogram Screening: Mammogram Screening: Patient over age 50, mutual decision to screen reflected in health maintenance.    Review of Systems   Constitutional: Negative for chills and fever.   HENT: Negative for congestion and ear pain.    Eyes: Negative for pain.   Respiratory: Negative for cough.    Cardiovascular: Negative for  "chest pain.   Gastrointestinal: Negative for abdominal pain, constipation, diarrhea and hematochezia.   Genitourinary: Negative for hematuria.   Neurological: Negative for dizziness.   Psychiatric/Behavioral: The patient is not nervous/anxious.        OBJECTIVE:   /86 (BP Location: Right arm, Patient Position: Chair, Cuff Size: Adult Large)   Pulse 52   Temp 97.9  F (36.6  C) (Oral)   Resp 16   Ht 1.6 m (5' 3\")   Wt 80.7 kg (178 lb)   Breastfeeding? No   BMI 31.53 kg/m   Estimated body mass index is 31.53 kg/m  as calculated from the following:    Height as of this encounter: 1.6 m (5' 3\").    Weight as of this encounter: 80.7 kg (178 lb).  Physical Exam  GENERAL APPEARANCE: healthy, alert and no distress  EYES: Eyes grossly normal to inspection, PERRL and conjunctivae and sclerae normal  HENT: ear canals and TM's normal, nose and mouth without ulcers or lesions, oropharynx clear and oral mucous membranes moist  NECK: no adenopathy, no asymmetry, masses, or scars and thyroid normal to palpation  RESP: lungs clear to auscultation - no rales, rhonchi or wheezes  BREAST: normal without masses, tenderness or nipple discharge and no palpable axillary masses or adenopathy  CV: regular rate and rhythm, normal S1 S2  ABDOMEN: soft, nontender,  and bowel sounds normal  MS: no musculoskeletal defects are noted and gait is age appropriate without ataxia  SKIN: no suspicious lesions or rashes  NEURO: Normal strength and tone, sensory exam grossly normal, mentation intact and speech normal  PSYCH: mentation appears normal and affect normal/bright    Diagnostic Test Results:  none     ASSESSMENT / PLAN:   1. Medicare annual wellness visit, subsequent      2. Glucose intolerance (impaired glucose tolerance)  - Hemoglobin A1c    3. Benign essential hypertension  - Albumin Random Urine Quantitative with Creat Ratio  - lisinopril (PRINIVIL/ZESTRIL) 20 MG tablet; Take 1 tablet (20 mg) by mouth daily  Dispense: 90 tablet; " "Refill: 1    4. Hyperlipidemia, unspecified hyperlipidemia type  - Comprehensive metabolic panel  - Lipid panel reflex to direct LDL Fasting    5. Osteopenia, unspecified location  - Vitamin D Deficiency    End of Life Planning:  Patient currently has an advanced directive: No.  I have verified the patient's ablity to prepare an advanced directive/make health care decisions.  Literature was provided to assist patient in preparing an advanced directive.    COUNSELING:  Reviewed preventive health counseling, as reflected in patient instructions       Regular exercise       Healthy diet/nutrition    BP Readings from Last 1 Encounters:   02/11/19 136/86     Estimated body mass index is 31.53 kg/m  as calculated from the following:    Height as of this encounter: 1.6 m (5' 3\").    Weight as of this encounter: 80.7 kg (178 lb).      Weight management plan: Discussed healthy diet and exercise guidelines     reports that she quit smoking about 33 years ago. Her smoking use included cigarettes. She quit after 17.00 years of use. she has never used smokeless tobacco.      Appropriate preventive services were discussed with this patient, including applicable screening as appropriate for cardiovascular disease, diabetes, osteopenia/osteoporosis, and glaucoma.  As appropriate for age/gender, discussed screening for colorectal cancer, prostate cancer, breast cancer, and cervical cancer. Checklist reviewing preventive services available has been given to the patient.    Reviewed patients plan of care and provided an AVS. The Basic Care Plan (routine screening as documented in Health Maintenance) for Sammi meets the Care Plan requirement. This Care Plan has been established and reviewed with the Patient.    Counseling Resources:  ATP IV Guidelines  Pooled Cohorts Equation Calculator  Breast Cancer Risk Calculator  FRAX Risk Assessment  ICSI Preventive Guidelines  Dietary Guidelines for Americans, 2010  USDA's MyPlate  ASA " Prophylaxis  Lung CA Screening    Pattie Little MD  [facility]

## 2019-02-12 LAB
CREAT UR-MCNC: 17 MG/DL
DEPRECATED CALCIDIOL+CALCIFEROL SERPL-MC: 52 UG/L (ref 20–75)
MICROALBUMIN UR-MCNC: <5 MG/L
MICROALBUMIN/CREAT UR: NORMAL MG/G CR (ref 0–25)

## 2019-02-13 ENCOUNTER — ANCILLARY PROCEDURE (OUTPATIENT)
Dept: MAMMOGRAPHY | Facility: CLINIC | Age: 69
End: 2019-02-13
Attending: FAMILY MEDICINE
Payer: COMMERCIAL

## 2019-02-13 ENCOUNTER — ALLIED HEALTH/NURSE VISIT (OUTPATIENT)
Dept: NURSING | Facility: CLINIC | Age: 69
End: 2019-02-13
Payer: COMMERCIAL

## 2019-02-13 VITALS — DIASTOLIC BLOOD PRESSURE: 88 MMHG | HEART RATE: 80 BPM | SYSTOLIC BLOOD PRESSURE: 142 MMHG

## 2019-02-13 DIAGNOSIS — Z12.31 VISIT FOR SCREENING MAMMOGRAM: ICD-10-CM

## 2019-02-13 DIAGNOSIS — I10 BENIGN ESSENTIAL HYPERTENSION: Primary | ICD-10-CM

## 2019-02-13 PROCEDURE — 77067 SCR MAMMO BI INCL CAD: CPT | Mod: TC

## 2019-02-13 PROCEDURE — 99207 ZZC NO CHARGE NURSE ONLY: CPT

## 2019-02-13 NOTE — PROGRESS NOTES
Pt brings newly purchased QuanDxdy BP monitor, has not tried, anxious about taking home BP, did not bring instructions, reviewed typical BP procedure, machine operates w/o difficulty, machine 144/99, repeat 5 minutes later by RN manually 142/88, pt extremely anxious, will monitor, recommend  if BP <140/90 see NWD 3 months per visit instructions, if BP > 140/90 consistently recommend f/u sooner, informed of clinic and BP appointments if needed, also brings updated immunization list from [facility], updated Boostrix, pneumococcal 23 and flu shot, others are entered, pt also brings derm diagnosis, wants FYI sent to N[facility],diagnosis seborrheic keratosis and sloar lentigo and cherry angioma, placed in NWD bin, not sure if you want added to problem list    Niharika Barker, RN, BSN  Message handled by Nurse Triage.

## 2019-02-15 ENCOUNTER — TELEPHONE (OUTPATIENT)
Dept: FAMILY MEDICINE | Facility: CLINIC | Age: 69
End: 2019-02-15

## 2019-02-15 NOTE — TELEPHONE ENCOUNTER
Called patient and discussed below result note.  Patient declined statin at this time.  FYI to Dr. Little.  Lindsay Munoz RN    Notes recorded by Anabel, Pattie Slater MD on 2/14/2019 at 4:59 PM CST  Please notify patient cholesterol is elevated. Her calculated cardiovascular risk is 12.3%. Usually anything >7.5% requires a statin. Can we send this.   A1c (blood sugars over 3 months) is 5.7 down from 5.9 a year ago. - we need to work on this with diet and exercise. Other labs are good.   The 10-year ASCVD risk score (Avon RYLEE Jr., et al., 2013) is: 12.3%    Values used to calculate the score:      Age: 68 years      Sex: Female      Is Non- : No      Diabetic: No      Tobacco smoker: No      Systolic Blood Pressure: 142 mmHg      Is BP treated: Yes      HDL Cholesterol: 68 mg/dL      Total Cholesterol: 228 mg/dL      NWD

## 2019-03-12 PROBLEM — Z87.2: Status: ACTIVE | Noted: 2019-03-12

## 2019-03-12 PROBLEM — L81.4 SOLAR LENTIGO: Status: ACTIVE | Noted: 2019-03-12

## 2019-03-12 PROBLEM — D18.01 CHERRY ANGIOMA: Status: ACTIVE | Noted: 2019-03-12

## 2019-09-03 ENCOUNTER — ALLIED HEALTH/NURSE VISIT (OUTPATIENT)
Dept: NURSING | Facility: CLINIC | Age: 69
End: 2019-09-03
Payer: COMMERCIAL

## 2019-09-03 VITALS — SYSTOLIC BLOOD PRESSURE: 150 MMHG | DIASTOLIC BLOOD PRESSURE: 92 MMHG

## 2019-09-03 DIAGNOSIS — I10 BENIGN ESSENTIAL HYPERTENSION: ICD-10-CM

## 2019-09-03 PROCEDURE — 99207 ZZC NO CHARGE NURSE ONLY: CPT

## 2019-09-03 RX ORDER — LISINOPRIL 20 MG/1
20 TABLET ORAL DAILY
Qty: 90 TABLET | Refills: 0 | Status: SHIPPED | OUTPATIENT
Start: 2019-09-03 | End: 2019-12-13

## 2019-09-03 RX ORDER — LISINOPRIL 20 MG/1
20 TABLET ORAL DAILY
Qty: 90 TABLET | Refills: 0 | Status: SHIPPED | OUTPATIENT
Start: 2019-09-03 | End: 2019-09-03

## 2019-09-03 NOTE — PROGRESS NOTES
Discussed with MA, see BP readings today, pt due for 6 month visit, needs lisinopril refill, pt has not missed any pills, informs stressed coming to visit due to traffic issues, agrees to f/u with NWD, pt shows reading from home and wnl, pt agrees to manage BP and if continued elevations will contact clinic for earlier appointment, will call back to schedule appointment  Prescription approved per [facility] Refill Protocol.  Niharika Barker, RN, BSN

## 2019-09-03 NOTE — PROGRESS NOTES
Sammi Manzano is a 69 year old patient who comes in today for a Blood Pressure check.  Initial BP:  BP (!) 150/92 (BP Location: Right arm, Patient Position: Chair, Cuff Size: Adult Large)      Data Unavailable patient states taking BP's at home averages 120-140's/50-60's. Patient statates taking meds and took med this morning.   Disposition: huddled with RN who recommends patient to f/u with PCP-RN will inform patient.

## 2019-12-11 ENCOUNTER — TELEPHONE (OUTPATIENT)
Dept: FAMILY MEDICINE | Facility: CLINIC | Age: 69
End: 2019-12-11

## 2019-12-11 NOTE — TELEPHONE ENCOUNTER
Panel Management Review      Patient has the following on her problem list:     Hypertension   Last three blood pressure readings:  BP Readings from Last 3 Encounters:   09/03/19 (!) 150/92   02/13/19 142/88   02/11/19 136/86     Blood pressure: FAILED    HTN Guidelines:  Less than 140/90      Composite cancer screening  Chart review shows that this patient is due/due soon for the following None  Summary:    Patient is due/failing the following:   BP CHECK    Action needed:   Patient needs office visit for blood pressure.    Type of outreach:    Sent letter.    Questions for provider review:    None                                                                                                                                    Jenny García [facility].           Chart routed to none .

## 2019-12-11 NOTE — LETTER
[facility]  [id number] [place] [id number] [phone]  December 11, 2019    Sammi Manzano  [id number]  [facility] [address]    Dear Sammi,    I care about your health and have reviewed your health plan. I have reviewed your medical conditions, medication list, and lab results and am making recommendations based on this review, to better manage your health.    You are in particular need of attention regarding:  -High Blood Pressure    I am recommending that you:  Make a appointment for blood pressure check.      Please call us at [phone] (or use [employer]) to address the above recommendations.     Thank you for trusting [facility] and we appreciate the opportunity to serve you.  We look forward to supporting your healthcare needs in the future.    Healthy Regards,    Pattie Little MD/celestina

## 2019-12-13 DIAGNOSIS — I10 BENIGN ESSENTIAL HYPERTENSION: ICD-10-CM

## 2019-12-13 RX ORDER — LISINOPRIL 20 MG/1
20 TABLET ORAL DAILY
Qty: 30 TABLET | Refills: 0 | Status: SHIPPED | OUTPATIENT
Start: 2019-12-13 | End: 2020-01-06

## 2019-12-13 NOTE — TELEPHONE ENCOUNTER
"Routing refill request to provider for review/approval because:  BP out of range  T'd up 1 month for provider review.      Requested Prescriptions   Pending Prescriptions Disp Refills     lisinopril (PRINIVIL/ZESTRIL) 20 MG tablet [Pharmacy Med Name: Lisinopril Oral Tablet 20 MG] 90 tablet 0     Sig: Take 1 tablet (20 mg) by mouth daily   Last Written Prescription Date:  9/3/2019  Last Fill Quantity: 90,  # refills: 0   Last office visit: 2/11/2019 with prescribing provider:     Future Office Visit:        ACE Inhibitors (Including Combos) Protocol Failed - 12/13/2019 12:11 PM        Failed - Blood pressure under 140/90 in past 12 months     BP Readings from Last 3 Encounters:   09/03/19 (!) 150/92   02/13/19 142/88   02/11/19 136/86           Passed - Recent (12 mo) or future (30 days) visit within the authorizing provider's specialty     Patient has had an office visit with the authorizing provider or a provider within the authorizing providers department within the previous 12 mos or has a future within next 30 days. See \"Patient Info\" tab in inbasket, or \"Choose Columns\" in Meds & Orders section of the refill encounter.            Passed - Medication is active on med list        Passed - Patient is age 18 or older        Passed - No active pregnancy on record        Passed - Normal serum creatinine on file in past 12 months     Recent Labs   Lab Test 02/11/19  1013   CR 0.75           Passed - Normal serum potassium on file in past 12 months     Recent Labs   Lab Test 02/11/19  1013   POTASSIUM 4.0           Passed - No positive pregnancy test within past 12 months      Radha Chung RN      "

## 2020-01-06 ENCOUNTER — TELEPHONE (OUTPATIENT)
Dept: FAMILY MEDICINE | Facility: CLINIC | Age: 70
End: 2020-01-06

## 2020-01-06 DIAGNOSIS — I10 BENIGN ESSENTIAL HYPERTENSION: ICD-10-CM

## 2020-01-06 RX ORDER — LISINOPRIL 20 MG/1
20 TABLET ORAL DAILY
Qty: 30 TABLET | Refills: 0 | Status: SHIPPED | OUTPATIENT
Start: 2020-01-06 | End: 2020-02-10 | Stop reason: DRUGHIGH

## 2020-01-06 NOTE — TELEPHONE ENCOUNTER
Refill extended  Next 5 appointments (look out 90 days)    Feb 10, 2020  7:30 AM CST  (Arrive by 7:05 AM)  Adult [facility] with Pattie Little MD  [facility] [address] [place] [id number]  [phone]        Prescription approved per [facility] Refill Protocol.  Niharika Barker RN, BSN

## 2020-02-06 NOTE — PROGRESS NOTES
"Pre-Visit Planning     Future Appointments   Date Time Provider Department Center   2/10/2020  7:30 AM Pattie Little MD [facility]   2/15/2020  9:00 AM [facility]     Arrival Time for this Appointment:  7:05 AM   Appointment Notes for this encounter:      Physical/med reveiw-lisinopril  Ok'd by insurance to come in early    Questionnaires Reviewed/Assigned  No additional questionnaires are needed      Patient preferred phone number: [phone]    Spoke to patient via phone. Patient does not have additional questions or concerns.        Visit is preventive. Reviewed purpose of preventive visit with patient.    Health Maintenance Due   Topic Date Due     ANNUAL REVIEW OF HM ORDERS  1950     ZOSTER IMMUNIZATION (1 of 2) 03/14/2000     ADVANCE CARE PLANNING  03/29/2017     PHQ-2  01/01/2020     MEDICARE ANNUAL WELLNESS VISIT  02/11/2020     MICROALBUMIN  02/11/2020     FALL RISK ASSESSMENT  02/11/2020     MAMMO SCREENING  02/13/2020     DEXA  03/01/2020     Patient is due for:        Incentive  Patient is not active on Incentive. Patient does not want to sign up for Incentive.    Questionnaire Review   Advised patient to arrive early in order to complete questionnaires.    Call Summary  \"Thank you for your time today.  If anything comes up before your appointment, please feel free to contact us at [phone].\"  "

## 2020-02-07 ENCOUNTER — DOCUMENTATION ONLY (OUTPATIENT)
Dept: LAB | Facility: CLINIC | Age: 70
End: 2020-02-07

## 2020-02-07 NOTE — TELEPHONE ENCOUNTER
Patient is scheduled to be seen on 2/10 so I can discuss specific orders with her and place them for 2/13.    Thanks  ASHER

## 2020-02-07 NOTE — PROGRESS NOTES
Patient is coming in for a lab only appointment on 2/13/20 and the appointment notes state patient is coming in for fasting labs. There are currently no orders in the patient chart for this lab appointment. Please place orders for the lab work you would like done on this patient. Thank You.

## 2020-02-10 ENCOUNTER — OFFICE VISIT (OUTPATIENT)
Dept: FAMILY MEDICINE | Facility: CLINIC | Age: 70
End: 2020-02-10
Payer: COMMERCIAL

## 2020-02-10 VITALS
OXYGEN SATURATION: 96 % | TEMPERATURE: 97.7 F | HEART RATE: 58 BPM | DIASTOLIC BLOOD PRESSURE: 72 MMHG | HEIGHT: 63 IN | SYSTOLIC BLOOD PRESSURE: 116 MMHG | WEIGHT: 161 LBS | BODY MASS INDEX: 28.53 KG/M2

## 2020-02-10 DIAGNOSIS — Z00.00 MEDICARE ANNUAL WELLNESS VISIT, SUBSEQUENT: Primary | ICD-10-CM

## 2020-02-10 DIAGNOSIS — M77.12 LATERAL EPICONDYLITIS OF LEFT ELBOW: ICD-10-CM

## 2020-02-10 DIAGNOSIS — I10 BENIGN ESSENTIAL HYPERTENSION: ICD-10-CM

## 2020-02-10 PROCEDURE — 99397 PER PM REEVAL EST PAT 65+ YR: CPT | Performed by: FAMILY MEDICINE

## 2020-02-10 RX ORDER — LISINOPRIL 10 MG/1
10 TABLET ORAL DAILY
Qty: 90 TABLET | Refills: 1 | Status: SHIPPED | OUTPATIENT
Start: 2020-02-10 | End: 2020-07-28

## 2020-02-10 SDOH — SOCIAL STABILITY: SOCIAL NETWORK: IN A TYPICAL WEEK, HOW MANY TIMES DO YOU TALK ON THE PHONE WITH FAMILY, FRIENDS, OR NEIGHBORS?: PATIENT DECLINED

## 2020-02-10 SDOH — HEALTH STABILITY: MENTAL HEALTH: HOW OFTEN DO YOU HAVE 6 OR MORE DRINKS ON ONE OCCASION?: NEVER

## 2020-02-10 SDOH — ECONOMIC STABILITY: TRANSPORTATION INSECURITY
IN THE PAST 12 MONTHS, HAS LACK OF TRANSPORTATION KEPT YOU FROM MEETINGS, WORK, OR FROM GETTING THINGS NEEDED FOR DAILY LIVING?: NO

## 2020-02-10 SDOH — ECONOMIC STABILITY: FOOD INSECURITY: WITHIN THE PAST 12 MONTHS, YOU WORRIED THAT YOUR FOOD WOULD RUN OUT BEFORE YOU GOT MONEY TO BUY MORE.: NEVER TRUE

## 2020-02-10 SDOH — ECONOMIC STABILITY: TRANSPORTATION INSECURITY
IN THE PAST 12 MONTHS, HAS THE LACK OF TRANSPORTATION KEPT YOU FROM MEDICAL APPOINTMENTS OR FROM GETTING MEDICATIONS?: NO

## 2020-02-10 SDOH — SOCIAL STABILITY: SOCIAL NETWORK: ARE YOU MARRIED, WIDOWED, DIVORCED, SEPARATED, NEVER MARRIED, OR LIVING WITH A PARTNER?: MARRIED

## 2020-02-10 SDOH — SOCIAL STABILITY: SOCIAL NETWORK: HOW OFTEN DO YOU ATTEND CHURCH OR RELIGIOUS SERVICES?: MORE THAN 4 TIMES PER YEAR

## 2020-02-10 SDOH — HEALTH STABILITY: MENTAL HEALTH: HOW MANY STANDARD DRINKS CONTAINING ALCOHOL DO YOU HAVE ON A TYPICAL DAY?: PATIENT DECLINED

## 2020-02-10 SDOH — ECONOMIC STABILITY: FOOD INSECURITY: WITHIN THE PAST 12 MONTHS, THE FOOD YOU BOUGHT JUST DIDN'T LAST AND YOU DIDN'T HAVE MONEY TO GET MORE.: NEVER TRUE

## 2020-02-10 SDOH — SOCIAL STABILITY: SOCIAL NETWORK: HOW OFTEN DO YOU ATTENT MEETINGS OF THE CLUB OR ORGANIZATION YOU BELONG TO?: MORE THAN 4 TIMES PER YEAR

## 2020-02-10 SDOH — HEALTH STABILITY: MENTAL HEALTH: HOW OFTEN DO YOU HAVE A DRINK CONTAINING ALCOHOL?: PATIENT DECLINED

## 2020-02-10 SDOH — SOCIAL STABILITY: SOCIAL NETWORK
DO YOU BELONG TO ANY CLUBS OR ORGANIZATIONS SUCH AS CHURCH GROUPS UNIONS, FRATERNAL OR ATHLETIC GROUPS, OR SCHOOL GROUPS?: YES

## 2020-02-10 SDOH — SOCIAL STABILITY: SOCIAL NETWORK: HOW OFTEN DO YOU GET TOGETHER WITH FRIENDS OR RELATIVES?: PATIENT DECLINED

## 2020-02-10 ASSESSMENT — ENCOUNTER SYMPTOMS
DYSURIA: 0
SHORTNESS OF BREATH: 0
DIZZINESS: 0
HEADACHES: 0
WEAKNESS: 0
COUGH: 0
JOINT SWELLING: 0
ARTHRALGIAS: 1
PALPITATIONS: 0
PARESTHESIAS: 0
CHILLS: 0
EYE PAIN: 0
DIARRHEA: 0
MYALGIAS: 1
HEMATURIA: 0
NERVOUS/ANXIOUS: 0
SORE THROAT: 0
HEARTBURN: 0
ABDOMINAL PAIN: 0
HEMATOCHEZIA: 0
FEVER: 0
NAUSEA: 0
FREQUENCY: 0
CONSTIPATION: 0

## 2020-02-10 ASSESSMENT — ACTIVITIES OF DAILY LIVING (ADL): CURRENT_FUNCTION: NO ASSISTANCE NEEDED

## 2020-02-10 ASSESSMENT — MIFFLIN-ST. JEOR: SCORE: 1224.42

## 2020-02-10 NOTE — PATIENT INSTRUCTIONS
For blood pressure due to lower BPs we will adjust dose to 10 mg daily from 20 mg. Please call with BP readings in the next 2-3 months for review.         Patient Education     Tennis Elbow  Muscles connect to bones by thick, fibrous cords (tendons). When the muscles are overused by repeated motion, the tendons may become inflamed and painful. This condition is called tendonitis.  Tennis elbow (lateral epicondylitis) is a form of tendonitis. It occurs when the forearm muscles are used again and again in a twisting motion. Pain from tennis elbow occurs mainly on the outside of the elbow. But the pain can spread into the forearm and wrist. Your elbow may also be swollen and tender to the touch.  The pain may get worse when you move your arm or do simple activities. Bending your wrist back, shaking hands, or turning a doorknob may cause pain. The pain often gets worse after several weeks or months. Sometimes you may feel pain when your arm is still.  Tennis players who use a backhand stroke with poor technique are more likely to get tennis elbow. But playing tennis is only one cause of tennis elbow. Other common activities that can cause it include:    Hammering    Painting    Raking  Besides tennis players, people at risk include , gardeners, musicians, and dentists. Sometimes people get tennis elbow without doing anything that would cause the injury.  Treatment includes resting the arm and taking anti-inflammatory medicines. Special splints can help ease symptoms. Symptoms should get better after 4 to 6 weeks of rest. You may need steroid injections if resting and using a splint don t help. After the pain is relieved, you should change your activities so the symptoms don t return. You may need physical therapy. It may include stretching, range-of-motion, and strengthening exercises. These treatments help most cases. You may need surgery if your symptoms continue for 6 months despite treatment.  Home  care  Follow these guidelines when caring for yourself at home:    Rest your elbow as needed. Protect it from movement that causes pain. You may be told to use a forearm splint at night to ease symptoms in the morning. Your healthcare provider may recommend a special wrap or splint to compress the muscles of the forearm. This can ease pain during daytime activities. As your symptoms get better, start to move your elbow more.    Put an ice pack on the injured area. Do this for 20 minutes every 1 to 2 hours the first day for pain relief. You can make an ice pack by wrapping a plastic bag of ice cubes in a thin towel. Continue using the ice pack 3 to 4 times a day for the next several days. Then use the ice pack as needed to ease pain and swelling.    You may use acetaminophen or ibuprofen to control pain, unless another pain medicine was prescribed. If you have chronic liver or kidney disease, talk with your healthcare provider before using these medicines. Also talk with your provider if you ve had a stomach ulcer or gastrointestinal bleeding.    After your elbow heals, avoid the motion that caused your pain. Or learn to move in a way that causes less stress on the tendon. Using a forearm wrap may keep tennis elbow from happening again.    A tennis elbow strap may ease pain and keep you from further injury when you start playing tennis again. You can also lower your risk for injury by warming up before you play and cooling down afterward. You should also use the right equipment. For instance, make sure your racquet has the right  and is the right size for you.  Follow-up care  Follow up with your healthcare provider, or as advised, if your symptoms don t get better after 2 to 3 weeks of treatment.  When to seek medical advice  Call your healthcare provider right away if any of these occur:    Redness over the painful area    Pain, stiffness,  or swelling at the elbow gets worse    Any numbness or tingling in your  arm, hands, or fingers    Unexplained fever over 100.4 F (38 C)   Date Last Reviewed: 5/1/2017 2000-2019 [employer]. [address] [place] [address], [place] [address]. All rights reserved. This information is not intended as a substitute for professional medical care. Always follow your healthcare professional's instructions.           Patient Education     Prevention Guidelines, Women Ages 65 and Older  Screening tests and vaccines are an important part of managing your health. A screening test is done to find possible disorders or diseases in people who don't have any symptoms. The goal is to find a disease early so lifestyle changes can be made and you can be watched more closely to reduce the risk of disease, or to detect it early enough to treat it most effectively. Screening tests are not considered diagnostic, but are used to determine if more testing is needed. Health counseling is essential, too. Below are guidelines for these, for women ages 65 and older. Talk with your healthcare provider to make sure you re up to date on what you need.  Screening Who needs it How often   Type 2 diabetes or prediabetes All women ages 40 to 75 who are overweight or obese At least every 3 years   Type 2 diabetes All women with prediabetes Every year   Alcohol misuse All women in this age group At routine exams   Blood pressure All women in this age group Yearly checkup if your blood pressure is normal*  Normal blood pressure is less than 120/80 mm Hg*  If your blood pressure reading is higher than normal, follow the advice of your healthcare provider   Breast cancer All women of average risk  There are no guidelines for breast cancer screening for 75 years and older.  Mammograms should be done every 1 or 2 years until age 75. At that point a woman should talk to her doctor about whether to continue screening. Talk to your doctor regarding your recommended frequency depending on your risk factors.   Cervical cancer  Only women who had abnormal screening results before age 65 Talk with your healthcare provider   Chlamydia Women at increased risk for infection At routine exams   Colorectal cancer All women over the age of 50  This screening is advised against for women over 75 or if there is a life expectancy of less than 10 years.  Multiple tests are available and are used at different times. Possible tests include: flexible sigmoidoscopy, colonoscopy, double-contrast barium enema, yearly fecal occult blood test, fecal immunochemical test, or stool DNA test as often as your healthcare provider advises. Talk with your healthcare provider about which tests are best for you.   Depression All women in this age group At routine exams   Gonorrhea Sexually active women at increased risk for infection At routine exams   Hepatitis C Anyone at increased risk; 1 time for those born between 1945 and 1965 At routine exams   High cholesterol or triglycerides All women in this age group who are at risk for coronary artery disease At least every 5 years   HIV Women at increased risk for infection-talk with your healthcare provider At routine exams   Lung cancer Adults ages 55 to 74 who have smoked with a 30-pack-a-year history and have smoked within the past 15 years  Annual low dose CT scan   Obesity All women in this age group At routine exams   Osteoporosis All women in this age group Bone density test at age 65, then follow-up as advised by your healthcare provider   Syphilis Women at increased risk for infection-talk with your healthcare provider At routine exams   Thyroid-Stimulating Hormone (TSH) All women in this age group with symptoms of thyroid dysfunction. There is not enough evidence to support TSH screening in women without symptoms.  ACOG recommendation is every 5 years; American Academy of Family Physicians concludes there is not enough evidence to support routine screening in adults without symptoms.    Tuberculosis Women at  increased risk for infection-talk with your healthcare provider Ask your healthcare provider   Vision All women in this age group Every 1 to 2 years; if you have a chronic health condition, ask your healthcare provider if you need exams more often   Vaccine Who needs it How often   Chickenpox (varicella) All women in this age group who have no record of this infection or vaccine 2 doses; second dose should be given at least 4 weeks after the first dose   Hepatitis A Women at increased risk for infection-talk with your healthcare provider 2 doses given 6 months apart   Hepatitis B Women at increased risk for infection-talk with your healthcare provider 3 doses over 6 months; second dose should be given 1 month after the first dose; the third dose should be given at least 2 months after the second dose and at least 4 months after the first dose   Haemophilus influenza Type B (HIB) Women at increased risk for infection-talk with your healthcare provider 1 to 3 doses   Influenza (flu) All women in this age group Once a year   Pneumococcal conjugate vaccine (PCV13) and pneumococcal polysaccharide vaccine (PPSV23) All women in this age group 1 dose of each vaccine   Tetanus/diphtheria/pertussis (Td/Tdap) booster All women in this age group Td every 10 years, or a one-time dose of Tdap instead of a Td booster after age 18, then Td every 10 years   Zoster All women in this age group 1 dose   Counseling Who needs it How often   Diet and exercise Women who are overweight or obese When diagnosed, and then at routine exams   Fall prevention (exercise and vitamin D supplements) All women in this age group At routine exams   Sexually transmitted infection prevention Women at increased risk for infection-talk with your healthcare provider At routine exams   Use of daily aspirin Talk to your healthcare provider about whether or not to start taking low-dose aspirin for the prevention of cardiovascular disease (CVD) and colorectal  cancer in adults ages 60 to 69 who have at least a 10% risk of getting CVD within the next 10 years.  People who are not at increased risk for bleeding, have a life expectancy of at least 10 years, and are willing to take low-dose aspirin daily for at least 10 years are more likely to benefit. When the advantages of taking low-dose aspirin outweigh the risks, people may choose to start taking a low-dose aspirin.  There is not enough data to support the use of aspirin in people over the age of 70.  When your risk is known   Use of tobacco and the health effects it can cause All women in this age group Every exam   Date Last Reviewed: 11/1/2017 2000-2019 [employer]. [address] [place] [address], [place] [address]. All rights reserved. This information is not intended as a substitute for professional medical care. Always follow your healthcare professional's instructions.

## 2020-02-10 NOTE — PROGRESS NOTES
"SUBJECTIVE:   Sammi Manzano is a 69 year old female who presents for Preventive Visit.      Are you in the first 12 months of your Medicare coverage?  No    Healthy Habits:     In general, how would you rate your overall health?  Good    Frequency of exercise:  1 day/week    Duration of exercise:  15-30 minutes    Do you usually eat at least 4 servings of fruit and vegetables a day, include whole grains    & fiber and avoid regularly eating high fat or \"junk\" foods?  Yes    Taking medications regularly:  Yes    Medication side effects:  Not applicable    Ability to successfully perform activities of daily living:  No assistance needed    Home Safety:  No safety concerns identified    Hearing Impairment:  Difficulty following a conversation in a noisy restaurant or crowded room, feel that people are mumbling or not speaking clearly, difficulty following dialogue in the theater, difficult to understand a speaker at a public meeting or [employer] service, need to ask people to speak up or repeat themselves, find that men's voices are easier to understand than woman's, difficulty understanding soft or whispered speech and difficulty understanding speech on the telephone    In the past 6 months, have you been bothered by leaking of urine?  No    In general, how would you rate your overall mental or emotional health?  Good      PHQ-2 Total Score: 0    Additional concerns today:  Yes      Do you feel safe in your environment? Yes    Have you ever done Advance Care Planning? (For example, a Health Directive, POLST, or a discussion with a medical provider or your loved ones about your wishes): Yes, patient states has an Advance Care Planning document and will bring a copy to the clinic.      Fall risk  Fallen 2 or more times in the past year?: No  Any fall with injury in the past year?: No    Cognitive Screening   1) Repeat 3 items (Leader, Season, Table)    2) Clock draw: NORMAL  3) 3 item recall: Recalls 3 " objects  Results: 3 items recalled: COGNITIVE IMPAIRMENT LESS LIKELY    Mini-CogTM Copyright LOLA Moore. Licensed by the author for use in [facility]; reprinted with permission ([email or web address]@.[email or web address]). All rights reserved.      Do you have sleep apnea, excessive snoring or daytime drowsiness?: no    Reviewed and updated as needed this visit by clinical staff  Tobacco  Allergies  Med Hx  Surg Hx  Fam Hx  Soc Hx        Reviewed and updated as needed this visit by Provider        Social History     Tobacco Use     Smoking status: Former Smoker     Years: 17.00     Types: Cigarettes     Last attempt to quit: 1986     Years since quittin.1     Smokeless tobacco: Never Used   Substance Use Topics     Alcohol use: No     Frequency: Patient refused     Drinks per session: Patient refused     Binge frequency: Never     If you drink alcohol do you typically have >3 drinks per day or >7 drinks per week? Not applicable    Alcohol Use 2/10/2020   Prescreen: >3 drinks/day or >7 drinks/week? No   Prescreen: >3 drinks/day or >7 drinks/week? -   No flowsheet data found.    Other concerns:  1. Left elbow pain- x 5 months  Worse with shoveling snow. Has been taking tylenol extra strength about 2-3 times a week.   Unable to comfortably lay on the affected side     2. HTN- -128; DBP 57-73    Has changed her diet significantly since last visit and lost about 17 lbs.       Wt Readings from Last 4 Encounters:   02/10/20 73 kg (161 lb)   19 80.7 kg (178 lb)   18 78.5 kg (173 lb)   17 80.7 kg (178 lb)       Eye exam at [facility]- 2020- WNL         Current providers sharing in care for this patient include:  Patient Care Team:  Pattie Little MD as PCP - General (Family Practice)  Pattie Little MD as Assigned PCP    The following health maintenance items are reviewed in Epic and correct as of today:  Health Maintenance   Topic Date Due     ANNUAL REVIEW OF   "ORDERS  1950     ZOSTER IMMUNIZATION (1 of 2) 03/14/2000     MEDICARE ANNUAL WELLNESS VISIT  02/11/2020     MICROALBUMIN  02/11/2020     MAMMO SCREENING  02/13/2020     FALL RISK ASSESSMENT  02/10/2021     DEXA  03/01/2022     LIPID  02/11/2024     ADVANCE CARE PLANNING  02/10/2025     COLONOSCOPY  02/22/2027     DTAP/TDAP/TD IMMUNIZATION (3 - Td) 03/06/2027     HEPATITIS C SCREENING  Completed     PHQ-2  Completed     INFLUENZA VACCINE  Completed     PNEUMOCOCCAL IMMUNIZATION 65+ LOW/MEDIUM RISK  Completed     IPV IMMUNIZATION  Aged Out     MENINGITIS IMMUNIZATION  Aged Out     Labs reviewed in [facility]  Mammogram Screening: Mammogram Screening: Patient over age 50, mutual decision to screen reflected in health maintenance.    Review of Systems   Constitutional: Negative for chills and fever.   HENT: Negative for congestion, ear pain, hearing loss and sore throat.    Eyes: Negative for pain and visual disturbance.   Respiratory: Negative for cough and shortness of breath.    Cardiovascular: Negative for chest pain, palpitations and peripheral edema.   Gastrointestinal: Negative for abdominal pain, constipation, diarrhea, heartburn, hematochezia and nausea.   Breasts:  Negative for tenderness and discharge.   Genitourinary: Negative for dysuria, frequency, genital sores, hematuria, pelvic pain, urgency, vaginal bleeding and vaginal discharge.   Musculoskeletal: Positive for arthralgias and myalgias. Negative for joint swelling.   Skin: Negative for rash.   Neurological: Negative for dizziness, weakness, headaches and paresthesias.   Psychiatric/Behavioral: Negative for mood changes. The patient is not nervous/anxious.          OBJECTIVE:   /72 (BP Location: Right arm, Patient Position: Chair, Cuff Size: Adult Regular)   Pulse 58   Temp 97.7  F (36.5  C) (Oral)   Ht 1.6 m (5' 3\")   Wt 73 kg (161 lb)   SpO2 96%   BMI 28.52 kg/m   Estimated body mass index is 28.52 kg/m  as calculated from the " "following:    Height as of this encounter: 1.6 m (5' 3\").    Weight as of this encounter: 73 kg (161 lb).  Physical Exam  GENERAL APPEARANCE: healthy, alert and no distress  EYES: Eyes grossly normal to inspection, PERRL and conjunctivae and sclerae normal  HENT: ear canals and TM's normal, nose and mouth without ulcers or lesions, oropharynx clear and oral mucous membranes moist  NECK: no adenopathy, no asymmetry, masses, or scars and thyroid normal to palpation  RESP: lungs clear to auscultation - no rales, rhonchi or wheezes  BREAST: normal without masses, tenderness or nipple discharge and no palpable axillary masses or adenopathy  CV: regular rate and rhythm, normal S1 S2  ABDOMEN: soft, nontender,  and bowel sounds normal  MS: no musculoskeletal defects are noted and gait is age appropriate without ataxia  SKIN: solar lentigo b/l cheeks  NEURO: Normal strength and tone, sensory exam grossly normal, mentation intact and speech normal  PSYCH: mentation appears normal and affect normal/bright    Diagnostic Test Results:  Labs reviewed in Epic  none     ASSESSMENT / PLAN:   1. Medicare annual wellness visit, subsequent  - **Comprehensive metabolic panel FUTURE anytime; Future  - **A1C FUTURE anytime; Future  - Lipid panel reflex to direct LDL Fasting; Future    2. Lateral epicondylitis of left elbow  - natural course and treatment discussed. Nitroglycerin patch vs. Steroid injection vs. Physical therapy.      3. Benign essential hypertension  - BP readings reviewed today. Some lows. Long discussion with patient and will lower lisinopril dose from 20 mg to 10 mg daily. She will check in with RN over the phone with her home readings.   - Albumin Random Urine Quantitative with Creat Ratio; Future  - lisinopril (PRINIVIL/ZESTRIL) 10 MG tablet; Take 1 tablet (10 mg) by mouth daily  Dispense: 90 tablet; Refill: 1    COUNSELING:  Reviewed preventive health counseling, as reflected in patient instructions       Regular " "exercise       Healthy diet/nutrition       Osteoporosis Prevention/Bone Health    Estimated body mass index is 28.52 kg/m  as calculated from the following:    Height as of this encounter: 1.6 m (5' 3\").    Weight as of this encounter: 73 kg (161 lb).         reports that she quit smoking about 34 years ago. Her smoking use included cigarettes. She quit after 17.00 years of use. She has never used smokeless tobacco.      Appropriate preventive services were discussed with this patient, including applicable screening as appropriate for cardiovascular disease, diabetes, osteopenia/osteoporosis, and glaucoma.  As appropriate for age/gender, discussed screening for colorectal cancer, prostate cancer, breast cancer, and cervical cancer. Checklist reviewing preventive services available has been given to the patient.    Reviewed patients plan of care and provided an AVS. The Basic Care Plan (routine screening as documented in Health Maintenance) for Sammi meets the Care Plan requirement. This Care Plan has been established and reviewed with the Patient.    Counseling Resources:  ATP IV Guidelines  Pooled Cohorts Equation Calculator  Breast Cancer Risk Calculator  FRAX Risk Assessment  ICSI Preventive Guidelines  Dietary Guidelines for Americans, 2010  USDA's MyPlate  ASA Prophylaxis  Lung CA Screening    Pattie Little MD  [facility]    Identified Health Risks:  "

## 2020-02-13 DIAGNOSIS — Z00.00 MEDICARE ANNUAL WELLNESS VISIT, SUBSEQUENT: ICD-10-CM

## 2020-02-13 DIAGNOSIS — I10 BENIGN ESSENTIAL HYPERTENSION: ICD-10-CM

## 2020-02-13 LAB
ALBUMIN SERPL-MCNC: 3.9 G/DL (ref 3.4–5)
ALP SERPL-CCNC: 47 U/L (ref 40–150)
ALT SERPL W P-5'-P-CCNC: 20 U/L (ref 0–50)
ANION GAP SERPL CALCULATED.3IONS-SCNC: 5 MMOL/L (ref 3–14)
AST SERPL W P-5'-P-CCNC: 13 U/L (ref 0–45)
BILIRUB SERPL-MCNC: 0.5 MG/DL (ref 0.2–1.3)
BUN SERPL-MCNC: 15 MG/DL (ref 7–30)
CALCIUM SERPL-MCNC: 9.3 MG/DL (ref 8.5–10.1)
CHLORIDE SERPL-SCNC: 107 MMOL/L (ref 94–109)
CHOLEST SERPL-MCNC: 214 MG/DL
CO2 SERPL-SCNC: 26 MMOL/L (ref 20–32)
CREAT SERPL-MCNC: 0.74 MG/DL (ref 0.52–1.04)
CREAT UR-MCNC: 36 MG/DL
GFR SERPL CREATININE-BSD FRML MDRD: 83 ML/MIN/{1.73_M2}
GLUCOSE SERPL-MCNC: 115 MG/DL (ref 70–99)
HBA1C MFR BLD: 5.9 % (ref 0–5.6)
HDLC SERPL-MCNC: 67 MG/DL
LDLC SERPL CALC-MCNC: 127 MG/DL
MICROALBUMIN UR-MCNC: <5 MG/L
MICROALBUMIN/CREAT UR: NORMAL MG/G CR (ref 0–25)
NONHDLC SERPL-MCNC: 147 MG/DL
POTASSIUM SERPL-SCNC: 4 MMOL/L (ref 3.4–5.3)
PROT SERPL-MCNC: 7.2 G/DL (ref 6.8–8.8)
SODIUM SERPL-SCNC: 138 MMOL/L (ref 133–144)
TRIGL SERPL-MCNC: 98 MG/DL

## 2020-02-13 PROCEDURE — 80061 LIPID PANEL: CPT | Performed by: FAMILY MEDICINE

## 2020-02-13 PROCEDURE — 80053 COMPREHEN METABOLIC PANEL: CPT | Performed by: FAMILY MEDICINE

## 2020-02-13 PROCEDURE — 82043 UR ALBUMIN QUANTITATIVE: CPT | Performed by: FAMILY MEDICINE

## 2020-02-13 PROCEDURE — 83036 HEMOGLOBIN GLYCOSYLATED A1C: CPT | Performed by: FAMILY MEDICINE

## 2020-02-13 PROCEDURE — 36415 COLL VENOUS BLD VENIPUNCTURE: CPT | Performed by: FAMILY MEDICINE

## 2020-02-13 NOTE — LETTER
February 13, 2020      Sammi Krishna Jose Juan  [address]  [place] [address]        Dear Ms.Lindberg Hymandelio,    We are writing to inform you of your test results.      Regarding your cholesterol panel, your LDL (bad) cholesterol is still elevated but slightly better from last year.   Unfortunately this still means you need medication.     I recommend you continue with your diet changes.     Diet:    - Decrease the saturated fat in your diet (goal <7%)   - Decrease total cholesterol in your diet (goal < 200mg/day)   - Increase your fiber intake (goal 25gm daily)   - Consider adding plant stanols/sterols (2g/day) - Cholestoff by [employer] is one supplement that you may want to try     Your A1c (summary of blood sugars over the last 3 months) is higher compared to last year.   Please continue to make healthy lifestyle changes.     For further questions or concerns please let us know.     Resulted Orders   Lipid panel reflex to direct LDL Fasting   Result Value Ref Range    Cholesterol 214 (H) <200 mg/dL      Comment:      Desirable:       <200 mg/dl    Triglycerides 98 <150 mg/dL      Comment:      Fasting specimen    HDL Cholesterol 67 >49 mg/dL    LDL Cholesterol Calculated 127 (H) <100 mg/dL      Comment:      Above desirable:  100-129 mg/dl  Borderline High:  130-159 mg/dL  High:             160-189 mg/dL  Very high:       >189 mg/dl      Non HDL Cholesterol 147 (H) <130 mg/dL      Comment:      Above Desirable:  130-159 mg/dl  Borderline high:  160-189 mg/dl  High:             190-219 mg/dl  Very high:       >219 mg/dl     **A1C FUTURE anytime   Result Value Ref Range    Hemoglobin A1C 5.9 (H) 0 - 5.6 %      Comment:      Normal <5.7% Prediabetes 5.7-6.4%  Diabetes 6.5% or higher - adopted from ADA   consensus guidelines.     **Comprehensive metabolic panel FUTURE anytime   Result Value Ref Range    Sodium 138 133 - 144 mmol/L    Potassium 4.0 3.4 - 5.3 mmol/L    Chloride 107 94 - 109 mmol/L     Carbon Dioxide 26 20 - 32 mmol/L    Anion Gap 5 3 - 14 mmol/L    Glucose 115 (H) 70 - 99 mg/dL      Comment:      Fasting specimen    Urea Nitrogen 15 7 - 30 mg/dL    Creatinine 0.74 0.52 - 1.04 mg/dL    GFR Estimate 83 >60 mL/min/[1.73_m2]      Comment:      Non  GFR Calc  Starting 12/18/2018, serum creatinine based estimated GFR (eGFR) will be   calculated using the Chronic Kidney Disease Epidemiology Collaboration   (CKD-EPI) equation.      GFR Estimate If Black >90 >60 mL/min/[1.73_m2]      Comment:       GFR Calc  Starting 12/18/2018, serum creatinine based estimated GFR (eGFR) will be   calculated using the Chronic Kidney Disease Epidemiology Collaboration   (CKD-EPI) equation.      Calcium 9.3 8.5 - 10.1 mg/dL    Bilirubin Total 0.5 0.2 - 1.3 mg/dL    Albumin 3.9 3.4 - 5.0 g/dL    Protein Total 7.2 6.8 - 8.8 g/dL    Alkaline Phosphatase 47 40 - 150 U/L    ALT 20 0 - 50 U/L    AST 13 0 - 45 U/L   Albumin Random Urine Quantitative with Creat Ratio   Result Value Ref Range    Creatinine Urine 36 mg/dL    Albumin Urine mg/L <5 mg/L    Albumin Urine mg/g Cr Unable to calculate due to low value 0 - 25 mg/g Cr       If you have any questions or concerns, please call the clinic at the number listed above.       Sincerely,        Pattie Salguero MD/lf

## 2020-02-13 NOTE — RESULT ENCOUNTER NOTE
Please send patient a letter to let them know results are normal.    Dear Sammi,    Regarding your cholesterol panel, your LDL (bad) cholesterol is still elevated but slightly better from last year.   Unfortunately this still means you need medication.     I recommend you continue with your diet changes.     Diet:    - Decrease the saturated fat in your diet (goal <7%)   - Decrease total cholesterol in your diet (goal < 200mg/day)   - Increase your fiber intake (goal 25gm daily)   - Consider adding plant stanols/sterols (2g/day) - Cholestoff by [employer] is one supplement that you may want to try     Your A1c (summary of blood sugars over the last 3 months) is higher compared to last year.   Please continue to make healthy lifestyle changes.     For further questions or concerns please let us know.

## 2020-02-15 ENCOUNTER — ANCILLARY PROCEDURE (OUTPATIENT)
Dept: MAMMOGRAPHY | Facility: CLINIC | Age: 70
End: 2020-02-15
Attending: FAMILY MEDICINE
Payer: COMMERCIAL

## 2020-02-15 DIAGNOSIS — Z12.31 VISIT FOR SCREENING MAMMOGRAM: ICD-10-CM

## 2020-02-15 PROCEDURE — 77067 SCR MAMMO BI INCL CAD: CPT | Mod: TC

## 2020-07-27 DIAGNOSIS — I10 BENIGN ESSENTIAL HYPERTENSION: ICD-10-CM

## 2020-07-28 RX ORDER — LISINOPRIL 10 MG/1
10 TABLET ORAL DAILY
Qty: 90 TABLET | Refills: 1 | Status: SHIPPED | OUTPATIENT
Start: 2020-07-28 | End: 2020-08-03

## 2020-08-03 ENCOUNTER — TELEPHONE (OUTPATIENT)
Dept: FAMILY MEDICINE | Facility: CLINIC | Age: 70
End: 2020-08-03

## 2020-08-03 DIAGNOSIS — I10 BENIGN ESSENTIAL HYPERTENSION: ICD-10-CM

## 2020-08-03 RX ORDER — LISINOPRIL 10 MG/1
10 TABLET ORAL DAILY
Qty: 90 TABLET | Refills: 0 | Status: SHIPPED | OUTPATIENT
Start: 2020-08-03 | End: 2021-02-03

## 2020-08-03 NOTE — TELEPHONE ENCOUNTER
Pt called in saying her bp medication refill is getting low she hasd 19 tablets and [place] to cabin and [place] by the end of aug. Needs refill.per pcp note from last visit OV,call the pt for bp readings in 2-3 months please advice.      05/07/2020-155/84     Pulse-53    05/11/2020-131/71      p -57      05/30/2020-117/75      p- 60    06/15/2020 -127/86    p-81      06/21/2020-117/64   p-  67      07/21/2020-140/79   p  68      07/27/2020   146/68   p-   51      07/29/2020 -122/68   p-    86      07/31/2020- 142/79 p   -67.    08/03/2020   154/79   p-53    Jenny Hinojosa MA  [facility].

## 2020-09-29 NOTE — TELEPHONE ENCOUNTER
Patient calling, she scheduled an appointment for a physical with Dr. Jamaica Serna on 2/10/19 and is wondering if she needs to come in before that in order for her lisinopril rx to be filled. She has enough of her prescription until 1/16/20, can we do a partial fill until appt date? Please call patient back at [phone]         Lissy Olmedo-Patient Rep     no

## 2021-02-02 DIAGNOSIS — I10 BENIGN ESSENTIAL HYPERTENSION: ICD-10-CM

## 2021-02-03 RX ORDER — LISINOPRIL 10 MG/1
10 TABLET ORAL DAILY
Qty: 90 TABLET | Refills: 0 | Status: SHIPPED | OUTPATIENT
Start: 2021-02-03 | End: 2021-08-05

## 2021-02-03 NOTE — TELEPHONE ENCOUNTER
Prescription approved per [facility] Refill Protocol.    Lindsay Munoz RN

## 2021-03-03 ENCOUNTER — IMMUNIZATION (OUTPATIENT)
Dept: NURSING | Facility: CLINIC | Age: 71
End: 2021-03-03
Payer: COMMERCIAL

## 2021-03-03 PROCEDURE — 91301 PR COVID VAC MODERNA 100 MCG/0.5 ML IM: CPT

## 2021-03-03 PROCEDURE — 0011A PR COVID VAC MODERNA 100 MCG/0.5 ML IM: CPT

## 2021-03-31 ENCOUNTER — IMMUNIZATION (OUTPATIENT)
Dept: NURSING | Facility: CLINIC | Age: 71
End: 2021-03-31
Attending: INTERNAL MEDICINE
Payer: COMMERCIAL

## 2021-03-31 PROCEDURE — 91301 PR COVID VAC MODERNA 100 MCG/0.5 ML IM: CPT

## 2021-03-31 PROCEDURE — 0012A PR COVID VAC MODERNA 100 MCG/0.5 ML IM: CPT

## 2021-04-04 ENCOUNTER — HEALTH MAINTENANCE LETTER (OUTPATIENT)
Age: 71
End: 2021-04-04

## 2021-04-19 ENCOUNTER — TRANSFERRED RECORDS (OUTPATIENT)
Dept: HEALTH INFORMATION MANAGEMENT | Facility: CLINIC | Age: 71
End: 2021-04-19

## 2021-05-06 DIAGNOSIS — I10 BENIGN ESSENTIAL HYPERTENSION: ICD-10-CM

## 2021-05-06 RX ORDER — LISINOPRIL 10 MG/1
10 TABLET ORAL DAILY
Qty: 90 TABLET | Refills: 0 | OUTPATIENT
Start: 2021-05-06

## 2021-05-06 NOTE — TELEPHONE ENCOUNTER
Routing refill request to provider for review/approval because:  Latosha given x1 and patient did not follow up, please advise  Labs not current:  BP, Creatinine, Potassium  Patient needs to be seen because it has been more than 1 year since last office visit.    Re-route to [place]/[facility] for scheduling  Jett Way RN

## 2021-05-06 NOTE — TELEPHONE ENCOUNTER
Medication Question or Refill    Who is calling: Sammi    What medication are you calling about (include dose and sig)?: Lisinopril    Controlled Substance Agreement on file:     Who prescribed the medication?: Jose M    Do you need a refill? Yes:     When did you use the medication last?     Patient offered an appointment? No    Do you have any questions or concerns?  No    Requested Pharmacy: [facility]    Okay to leave a detailed message?: Yes at Cell number on file:    Telephone Information:   Mobile [phone]

## 2021-05-29 ENCOUNTER — HEALTH MAINTENANCE LETTER (OUTPATIENT)
Age: 71
End: 2021-05-29

## 2021-08-02 DIAGNOSIS — I10 BENIGN ESSENTIAL HYPERTENSION: ICD-10-CM

## 2021-08-03 NOTE — TELEPHONE ENCOUNTER
Routing refill request to provider for review/approval because:  Labs not current:  BMP  Patient needs to be seen because it has been more than 1 year since last office visit.  Failing bp    Lindsay Munoz RN

## 2021-08-05 RX ORDER — LISINOPRIL 10 MG/1
10 TABLET ORAL DAILY
Qty: 30 TABLET | Refills: 0 | Status: SHIPPED | OUTPATIENT
Start: 2021-08-05 | End: 2021-08-23 | Stop reason: DRUGHIGH

## 2021-08-23 ENCOUNTER — OFFICE VISIT (OUTPATIENT)
Dept: FAMILY MEDICINE | Facility: CLINIC | Age: 71
End: 2021-08-23
Payer: COMMERCIAL

## 2021-08-23 VITALS
DIASTOLIC BLOOD PRESSURE: 90 MMHG | HEIGHT: 64 IN | TEMPERATURE: 98.7 F | BODY MASS INDEX: 28.95 KG/M2 | WEIGHT: 169.6 LBS | SYSTOLIC BLOOD PRESSURE: 138 MMHG | RESPIRATION RATE: 18 BRPM | HEART RATE: 70 BPM | OXYGEN SATURATION: 98 %

## 2021-08-23 DIAGNOSIS — Z00.00 ENCOUNTER FOR MEDICARE ANNUAL WELLNESS EXAM: Primary | ICD-10-CM

## 2021-08-23 DIAGNOSIS — E78.5 HYPERLIPIDEMIA, UNSPECIFIED HYPERLIPIDEMIA TYPE: ICD-10-CM

## 2021-08-23 DIAGNOSIS — R73.02 GLUCOSE INTOLERANCE (IMPAIRED GLUCOSE TOLERANCE): ICD-10-CM

## 2021-08-23 DIAGNOSIS — E66.3 OVERWEIGHT (BMI 25.0-29.9): ICD-10-CM

## 2021-08-23 DIAGNOSIS — M19.041 OSTEOARTHRITIS OF RIGHT HAND, UNSPECIFIED OSTEOARTHRITIS TYPE: ICD-10-CM

## 2021-08-23 DIAGNOSIS — I10 BENIGN ESSENTIAL HYPERTENSION: ICD-10-CM

## 2021-08-23 DIAGNOSIS — H91.93 HEARING DIFFICULTY OF BOTH EARS: ICD-10-CM

## 2021-08-23 DIAGNOSIS — Z12.31 ENCOUNTER FOR SCREENING MAMMOGRAM FOR BREAST CANCER: ICD-10-CM

## 2021-08-23 LAB
ALBUMIN SERPL-MCNC: 4.3 G/DL (ref 3.4–5)
ALP SERPL-CCNC: 58 U/L (ref 40–150)
ALT SERPL W P-5'-P-CCNC: 23 U/L (ref 0–50)
ANION GAP SERPL CALCULATED.3IONS-SCNC: 6 MMOL/L (ref 3–14)
AST SERPL W P-5'-P-CCNC: 14 U/L (ref 0–45)
BASOPHILS # BLD AUTO: 0 10E3/UL (ref 0–0.2)
BASOPHILS NFR BLD AUTO: 0 %
BILIRUB SERPL-MCNC: 0.5 MG/DL (ref 0.2–1.3)
BUN SERPL-MCNC: 16 MG/DL (ref 7–30)
CALCIUM SERPL-MCNC: 9.5 MG/DL (ref 8.5–10.1)
CHLORIDE BLD-SCNC: 104 MMOL/L (ref 94–109)
CHOLEST SERPL-MCNC: 240 MG/DL
CO2 SERPL-SCNC: 24 MMOL/L (ref 20–32)
CREAT SERPL-MCNC: 0.73 MG/DL (ref 0.52–1.04)
EOSINOPHIL # BLD AUTO: 0.1 10E3/UL (ref 0–0.7)
EOSINOPHIL NFR BLD AUTO: 1 %
ERYTHROCYTE [DISTWIDTH] IN BLOOD BY AUTOMATED COUNT: 12.8 % (ref 10–15)
FASTING STATUS PATIENT QL REPORTED: YES
GFR SERPL CREATININE-BSD FRML MDRD: 83 ML/MIN/1.73M2
GLUCOSE BLD-MCNC: 114 MG/DL (ref 70–99)
HBA1C MFR BLD: 5.9 % (ref 0–5.6)
HCT VFR BLD AUTO: 43.6 % (ref 35–47)
HDLC SERPL-MCNC: 67 MG/DL
HGB BLD-MCNC: 14.4 G/DL (ref 11.7–15.7)
LDLC SERPL CALC-MCNC: 149 MG/DL
LYMPHOCYTES # BLD AUTO: 2 10E3/UL (ref 0.8–5.3)
LYMPHOCYTES NFR BLD AUTO: 31 %
MCH RBC QN AUTO: 31.4 PG (ref 26.5–33)
MCHC RBC AUTO-ENTMCNC: 33 G/DL (ref 31.5–36.5)
MCV RBC AUTO: 95 FL (ref 78–100)
MONOCYTES # BLD AUTO: 0.5 10E3/UL (ref 0–1.3)
MONOCYTES NFR BLD AUTO: 8 %
NEUTROPHILS # BLD AUTO: 3.9 10E3/UL (ref 1.6–8.3)
NEUTROPHILS NFR BLD AUTO: 60 %
NONHDLC SERPL-MCNC: 173 MG/DL
PLATELET # BLD AUTO: 279 10E3/UL (ref 150–450)
POTASSIUM BLD-SCNC: 3.8 MMOL/L (ref 3.4–5.3)
PROT SERPL-MCNC: 7.9 G/DL (ref 6.8–8.8)
RBC # BLD AUTO: 4.58 10E6/UL (ref 3.8–5.2)
SODIUM SERPL-SCNC: 134 MMOL/L (ref 133–144)
TRIGL SERPL-MCNC: 122 MG/DL
WBC # BLD AUTO: 6.4 10E3/UL (ref 4–11)

## 2021-08-23 PROCEDURE — 85025 COMPLETE CBC W/AUTO DIFF WBC: CPT | Performed by: FAMILY MEDICINE

## 2021-08-23 PROCEDURE — 83036 HEMOGLOBIN GLYCOSYLATED A1C: CPT | Performed by: FAMILY MEDICINE

## 2021-08-23 PROCEDURE — 80061 LIPID PANEL: CPT | Performed by: FAMILY MEDICINE

## 2021-08-23 PROCEDURE — 99397 PER PM REEVAL EST PAT 65+ YR: CPT | Performed by: FAMILY MEDICINE

## 2021-08-23 PROCEDURE — 36415 COLL VENOUS BLD VENIPUNCTURE: CPT | Performed by: FAMILY MEDICINE

## 2021-08-23 PROCEDURE — 82043 UR ALBUMIN QUANTITATIVE: CPT | Performed by: FAMILY MEDICINE

## 2021-08-23 PROCEDURE — 80053 COMPREHEN METABOLIC PANEL: CPT | Performed by: FAMILY MEDICINE

## 2021-08-23 PROCEDURE — 99214 OFFICE O/P EST MOD 30 MIN: CPT | Mod: 25 | Performed by: FAMILY MEDICINE

## 2021-08-23 RX ORDER — BETAMETHASONE DIPROPIONATE 0.5 MG/G
CREAM TOPICAL
COMMUNITY
Start: 2021-08-06 | End: 2023-03-21

## 2021-08-23 RX ORDER — TROLAMINE SALICYLATE 10 G/100G
CREAM TOPICAL PRN
COMMUNITY

## 2021-08-23 RX ORDER — LISINOPRIL 20 MG/1
20 TABLET ORAL DAILY
Qty: 90 TABLET | Refills: 3 | Status: SHIPPED | OUTPATIENT
Start: 2021-08-23 | End: 2022-08-14

## 2021-08-23 ASSESSMENT — ENCOUNTER SYMPTOMS
HEARTBURN: 0
CONSTIPATION: 0
DIARRHEA: 0
SORE THROAT: 0
DIZZINESS: 0
JOINT SWELLING: 1
EYE PAIN: 0
CHILLS: 0
PALPITATIONS: 0
HEMATOCHEZIA: 0
NAUSEA: 0
FREQUENCY: 0
HEMATURIA: 0
NERVOUS/ANXIOUS: 0
ARTHRALGIAS: 1
BREAST MASS: 0
SHORTNESS OF BREATH: 0
WEAKNESS: 0
FEVER: 0
HEADACHES: 0
ABDOMINAL PAIN: 0
PARESTHESIAS: 0
MYALGIAS: 0
COUGH: 0
DYSURIA: 0

## 2021-08-23 ASSESSMENT — MIFFLIN-ST. JEOR: SCORE: 1261.36

## 2021-08-23 ASSESSMENT — ACTIVITIES OF DAILY LIVING (ADL): CURRENT_FUNCTION: NO ASSISTANCE NEEDED

## 2021-08-23 NOTE — PROGRESS NOTES
"SUBJECTIVE:   Sammi Manzano is a 71 year old female who presents for Preventive Visit.    Patient has been advised of split billing requirements and indicates understanding: Yes     Are you in the first 12 months of your Medicare coverage?  No    Healthy Habits:     In general, how would you rate your overall health?  Excellent    Frequency of exercise:  1 day/week    Duration of exercise:  15-30 minutes    Do you usually eat at least 4 servings of fruit and vegetables a day, include whole grains    & fiber and avoid regularly eating high fat or \"junk\" foods?  Yes    Medication side effects:  None    Ability to successfully perform activities of daily living:  No assistance needed    Home Safety:  No safety concerns identified    Hearing Impairment:  Difficulty following a conversation in a noisy restaurant or crowded room, feel that people are mumbling or not speaking clearly, difficulty following dialogue in the theater, difficult to understand a speaker at a public meeting or Religion service, need to ask people to speak up or repeat themselves, difficulty understanding soft or whispered speech and difficulty understanding speech on the telephone    In the past 6 months, have you been bothered by leaking of urine?  No    In general, how would you rate your overall mental or emotional health?  Excellent      PHQ-2 Total Score: 0    Additional concerns today:  No    Do you feel safe in your environment? Yes    Have you ever done Advance Care Planning? (For example, a Health Directive, POLST, or a discussion with a medical provider or your loved ones about your wishes): Yes, patient states has an Advance Care Planning document and will bring a copy to the clinic.    Patient positive for hearing concerns as answered by HPI. Patient does not have hear aids. Notes that it is getting harder to hear over the telephone.      Fall risk  Fallen 2 or more times in the past year?: No  Any fall with injury in the " past year?: No    Cognitive Screening   1) Repeat 3 items (Leader, Season, Table)    2) Clock draw: NORMAL  3) 3 item recall: Recalls 3 objects  Results: 3 items recalled: COGNITIVE IMPAIRMENT LESS LIKELY    Mini-CogTM Copyright S Oscar. Licensed by the author for use in [facility]; reprinted with permission (john@[facility]). All rights reserved.      Do you have sleep apnea, excessive snoring or daytime drowsiness?: no    Reviewed and updated as needed this visit by clinical staff  Tobacco  Allergies  Meds   Med Hx  Surg Hx  Fam Hx  Soc Hx        Reviewed and updated as needed this visit by Provider    Meds             Social History     Tobacco Use     Smoking status: Former Smoker     Years: 17.00     Types: Cigarettes     Quit date: 1986     Years since quittin.6     Smokeless tobacco: Never Used   Substance Use Topics     Alcohol use: No     If you drink alcohol do you typically have >3 drinks per day or >7 drinks per week? Not applicable    Alcohol Use 2021   Prescreen: >3 drinks/day or >7 drinks/week? No   Prescreen: >3 drinks/day or >7 drinks/week? -       Other-   1.Right hand- arthritis worsening  2.Hearing difficulty- unable to hear on the phone., in [employer]regation or in crowds.   3.Elevated blood pressure- 140-180/64-96 (only one SBP reading of 105 all year long).     Wt Readings from Last 4 Encounters:   21 76.9 kg (169 lb 9.6 oz)   02/10/20 73 kg (161 lb)   19 80.7 kg (178 lb)   18 78.5 kg (173 lb)         Current providers sharing in care for this patient include:   Patient Care Team:  Pattie Little MD as PCP - General (Family Practice)  Pattie Little MD as Assigned PCP    The following health maintenance items are reviewed in Epic and correct as of today:  Health Maintenance Due   Topic Date Due     ZOSTER IMMUNIZATION (2 of 2) 2020     MICROALBUMIN  2021     MAMMO SCREENING  02/15/2021     Labs reviewed in  "EPIC          Review of Systems   Constitutional: Negative for chills and fever.   HENT: Positive for ear pain and hearing loss. Negative for congestion and sore throat.    Eyes: Negative for pain and visual disturbance.   Respiratory: Negative for cough and shortness of breath.    Cardiovascular: Negative for chest pain, palpitations and peripheral edema.   Gastrointestinal: Negative for abdominal pain, constipation, diarrhea, heartburn, hematochezia and nausea.   Breasts:  Negative for tenderness, breast mass and discharge.   Genitourinary: Negative for dysuria, frequency, genital sores, hematuria, pelvic pain, urgency, vaginal bleeding and vaginal discharge.   Musculoskeletal: Positive for arthralgias and joint swelling. Negative for myalgias.   Skin: Negative for rash.   Neurological: Negative for dizziness, weakness, headaches and paresthesias.   Psychiatric/Behavioral: The patient is not nervous/anxious.          OBJECTIVE:   BP (!) 138/90   Pulse 70   Temp 98.7  F (37.1  C) (Oral)   Resp 18   Ht 1.613 m (5' 3.5\")   Wt 76.9 kg (169 lb 9.6 oz)   SpO2 98%   BMI 29.57 kg/m   Estimated body mass index is 29.57 kg/m  as calculated from the following:    Height as of this encounter: 1.613 m (5' 3.5\").    Weight as of this encounter: 76.9 kg (169 lb 9.6 oz).  Physical Exam  GENERAL APPEARANCE: healthy, alert and no distress  EYES: Eyes grossly normal to inspection, PERRL and conjunctivae and sclerae normal  HENT: ear canals and TM's normal, nose and mouth without ulcers or lesions, oropharynx clear and oral mucous membranes moist  NECK: no adenopathy, no asymmetry, masses, or scars and thyroid normal to palpation  RESP: lungs clear to auscultation - no rales, rhonchi or wheezes  CV: regular rate and rhythm, normal S1 S2, no S3 or S4, no murmur, click or rub, no peripheral edema and peripheral pulses strong  ABDOMEN: soft, nontender,  and bowel sounds normal  MS: no musculoskeletal defects are noted and gait is " "age appropriate without ataxia  SKIN: no suspicious lesions or rashes  NEURO: Normal strength and tone, sensory exam grossly normal, mentation intact and speech normal  PSYCH: mentation appears normal and affect normal/bright    Diagnostic Test Results:  Labs reviewed in Epic  none     ASSESSMENT / PLAN:   (Z00.00) Encounter for Medicare annual wellness exam  (primary encounter diagnosis)  Plan: Hemoglobin A1c, Comprehensive metabolic panel         (BMP + Alb, Alk Phos, ALT, AST, Total. Bili,         TP), Lipid panel reflex to direct LDL Fasting,         CBC with platelets and differential            (Z12.31) Encounter for screening mammogram for breast cancer  Plan: MA SCREENING DIGITAL BILAT - Future  (s+30)    (I10) Benign essential hypertension  Comment: uncontrolled. Will increase lisinopril dose to 20 mg from 10 mg   Plan: Albumin Random Urine Quantitative with Creat         Ratio, lisinopril (ZESTRIL) 20 MG tablet      (M19.041) Osteoarthritis of right hand, unspecified osteoarthritis type  Comment: supportive measures discussed. Recommend ortho- not ready for this.       (H91.93) Hearing difficulty of both ears  Plan: Adult Audiology Referral    (E78.5) Hyperlipidemia, unspecified hyperlipidemia type  Comment:  Lipid panel reflex to direct LDL Fasting,      (R73.02) Glucose intolerance (impaired glucose tolerance)  Plan: Hemoglobin A1c      (E66.3) Overweight (BMI 25.0-29.9)  Comment: some weight gain over the last year. Diet and exercise reviewed       Patient has been advised of split billing requirements and indicates understanding: Yes  COUNSELING:  Reviewed preventive health counseling, as reflected in patient instructions       Regular exercise       Healthy diet/nutrition       Fall risk prevention    Estimated body mass index is 29.57 kg/m  as calculated from the following:    Height as of this encounter: 1.613 m (5' 3.5\").    Weight as of this encounter: 76.9 kg (169 lb 9.6 oz).    Weight management " plan: Discussed healthy diet and exercise guidelines    She reports that she quit smoking about 35 years ago. Her smoking use included cigarettes. She quit after 17.00 years of use. She has never used smokeless tobacco.      Appropriate preventive services were discussed with this patient, including applicable screening as appropriate for cardiovascular disease, diabetes, osteopenia/osteoporosis, and glaucoma.  As appropriate for age/gender, discussed screening for colorectal cancer, prostate cancer, breast cancer, and cervical cancer. Checklist reviewing preventive services available has been given to the patient.    Reviewed patients plan of care and provided an AVS. The Basic Care Plan (routine screening as documented in Health Maintenance) for Sammi meets the Care Plan requirement. This Care Plan has been established and reviewed with the Patient.    Counseling Resources:  ATP IV Guidelines  Pooled Cohorts Equation Calculator  Breast Cancer Risk Calculator  Breast Cancer: Medication to Reduce Risk  FRAX Risk Assessment  ICSI Preventive Guidelines  Dietary Guidelines for Americans, 2010  USDA's MyPlate  ASA Prophylaxis  Lung CA Screening    Pattie Little MD  [facility]    Identified Health Risks:

## 2021-08-23 NOTE — PATIENT INSTRUCTIONS
Patient Education   Personalized Prevention Plan  You are due for the preventive services outlined below.  Your care team is available to assist you in scheduling these services.  If you have already completed any of these items, please share that information with your care team to update in your medical record.  Health Maintenance Due   Topic Date Due     Zoster (Shingles) Vaccine (2 of 2) 04/06/2020     Annual Wellness Visit  02/10/2021     ANNUAL REVIEW OF HM ORDERS  02/10/2021     FALL RISK ASSESSMENT  02/10/2021     Kidney Microalbumin Urine Test  02/13/2021     Mammogram  02/15/2021     Preventive Health Recommendations    See your health care provider every year to    Review health changes.     Discuss preventive care.      Review your medicines if your doctor has prescribed any.    You no longer need a yearly Pap test unless you've had an abnormal Pap test in the past 10 years. If you have vaginal symptoms, such as bleeding or discharge, be sure to talk with your provider about a Pap test.    Every 1 to 2 years, have a mammogram.  If you are over 69, talk with your health care provider about whether or not you want to continue having screening mammograms.    Every 10 years, have a colonoscopy. Or, have a yearly FIT test (stool test). These exams will check for colon cancer.     Have a cholesterol test every 5 years, or more often if your doctor advises it.     Have a diabetes test (fasting glucose) every three years. If you are at risk for diabetes, you should have this test more often.     At age 65, have a bone density scan (DEXA) to check for osteoporosis (brittle bone disease).    Shots:    Get a flu shot each year.    Get a tetanus shot every 10 years.    Talk to your doctor about your pneumonia vaccines. There are now two you should receive - Pneumovax (PPSV 23) and Prevnar (PCV 13).    Talk to your pharmacist about the shingles vaccine.    Talk to your doctor about the hepatitis B  vaccine.    Nutrition:     Eat at least 5 servings of fruits and vegetables each day.    Eat whole-grain bread, whole-wheat pasta and brown rice instead of white grains and rice.    Get adequate Calcium and Vitamin D.     Lifestyle    Exercise at least 150 minutes a week (30 minutes a day, 5 days a week). This will help you control your weight and prevent disease.    Limit alcohol to one drink per day.    No smoking.     Wear sunscreen to prevent skin cancer.     See your dentist twice a year for an exam and cleaning.    See your eye doctor every 1 to 2 years to screen for conditions such as glaucoma, macular degeneration and cataracts.    Personalized Prevention Plan  You are due for the preventive services outlined below.  Your care team is available to assist you in scheduling these services.  If you have already completed any of these items, please share that information with your care team to update in your medical record.  Health Maintenance   Topic Date Due     ZOSTER IMMUNIZATION (2 of 2) 04/06/2020     MEDICARE ANNUAL WELLNESS VISIT  02/10/2021     ANNUAL REVIEW OF HM ORDERS  02/10/2021     FALL RISK ASSESSMENT  02/10/2021     MICROALBUMIN  02/13/2021     MAMMO SCREENING  02/15/2021     INFLUENZA VACCINE (1) 09/01/2021     DEXA  03/01/2022     ADVANCE CARE PLANNING  02/10/2025     LIPID  02/13/2025     COLORECTAL CANCER SCREENING  02/22/2027     DTAP/TDAP/TD IMMUNIZATION (3 - Td or Tdap) 03/06/2027     HEPATITIS C SCREENING  Completed     PHQ-2  Completed     Pneumococcal Vaccine: 65+ Years  Completed     COVID-19 Vaccine  Completed     IPV IMMUNIZATION  Aged Out     MENINGITIS IMMUNIZATION  Aged Out     HEPATITIS B IMMUNIZATION  Aged Out        Patient Education     Understanding Carpometacarpal Osteoarthritis     The base of the thumb where it meets the hand is called the carpometacarpal (CMC) joint. This joint lets the thumb move freely in many directions. It also provides strength so the hand can grasp  and .   A smooth tissue called cartilage lines and cushions the bones of the CMC joint. Using the thumb puts stress on the joint. Over time, this can lead to the breakdown of the cartilage in the joint. This is known as osteoarthritis. With this condition, bones of the joint may be exposed and rub together. They may become irritated and rough. This keeps the joint from moving smoothly and can lead to pain.   How to say it  [id number]   What causes CMC joint osteoarthritis?    This type of osteoarthritis is mainly caused by years of using the hand and thumb. The condition may be more likely if you:     Regularly do things that put great stress on the thumb joint    Have had previous thumb injuries    Have weak or loose structures in the thumb      Symptoms of CMC joint osteoarthritis  Symptoms commonly include:    Thumb pain that may get worse with pinching or gripping    Thumb weakness    Sounds of grinding or popping in the thumb joint    Base of the thumb is enlarged   Treatment for CMC joint osteoarthritis  Osteoarthritis is a long-term (chronic) condition. Treatment focuses on managing symptoms. It may include:     Taking prescription or over-the-counter pain medicines to help reduce pain and swelling    Using a brace to rest and support the thumb joint    Using hot or cold therapy to help relieve pain    Learning and practicing ways to reduce stress on the thumb joint    Using devices that help protect the joint such as jar openers, pen , and spring-action scissors    Following a plan of physical therapy and exercises to help improve the flexibility and strength of the hand and thumb    Getting shots of medicine into the joint to help relieve symptoms for a time  If these treatments don t do enough to relieve severe pain, you may need surgery. There are several different types of surgery. In general, the goal is to ease pain and help you to be able to use the hand.   When to call your  healthcare provider  Call your healthcare provider right away if you have any of these:    Fever of 100.4 F (38 C) or higher, or as directed by your healthcare provider    Symptoms that don t get better, or get worse    New symptoms  Katelyn last reviewed this educational content on 11/1/2019 2000-2021 [employer]. All rights reserved. This information is not intended as a substitute for professional medical care. Always follow your healthcare professional's instructions.

## 2021-08-24 LAB
CREAT UR-MCNC: 19 MG/DL
MICROALBUMIN UR-MCNC: <5 MG/L
MICROALBUMIN/CREAT UR: NORMAL MG/G{CREAT}

## 2021-09-07 ENCOUNTER — ANCILLARY PROCEDURE (OUTPATIENT)
Dept: MAMMOGRAPHY | Facility: CLINIC | Age: 71
End: 2021-09-07
Attending: FAMILY MEDICINE
Payer: COMMERCIAL

## 2021-09-07 DIAGNOSIS — Z12.31 ENCOUNTER FOR SCREENING MAMMOGRAM FOR BREAST CANCER: ICD-10-CM

## 2021-09-07 PROCEDURE — 77067 SCR MAMMO BI INCL CAD: CPT | Mod: TC | Performed by: RADIOLOGY

## 2021-09-08 ENCOUNTER — TRANSFERRED RECORDS (OUTPATIENT)
Dept: HEALTH INFORMATION MANAGEMENT | Facility: CLINIC | Age: 71
End: 2021-09-08

## 2021-09-18 ENCOUNTER — HEALTH MAINTENANCE LETTER (OUTPATIENT)
Age: 71
End: 2021-09-18

## 2022-01-03 ENCOUNTER — TRANSFERRED RECORDS (OUTPATIENT)
Dept: HEALTH INFORMATION MANAGEMENT | Facility: CLINIC | Age: 72
End: 2022-01-03
Payer: COMMERCIAL

## 2022-08-13 DIAGNOSIS — I10 BENIGN ESSENTIAL HYPERTENSION: ICD-10-CM

## 2022-08-14 NOTE — TELEPHONE ENCOUNTER
, please reach out to assist with scheduling visit, yearly visit due after 8/23/22, Thank you    Routing refill request to provider for review/approval because:  BP not at goal     BP Readings from Last 6 Encounters:   08/23/21 (!) 138/90   02/10/20 116/72   09/03/19 (!) 150/92   02/13/19 142/88   02/11/19 136/86   11/06/18 138/88     Visit due  - routing to TC to call and schedule (due 8/23/22)

## 2022-08-16 RX ORDER — LISINOPRIL 20 MG/1
TABLET ORAL
Qty: 90 TABLET | Refills: 0 | Status: SHIPPED | OUTPATIENT
Start: 2022-08-16 | End: 2022-11-15

## 2022-08-16 NOTE — TELEPHONE ENCOUNTER
Informed Pt is not able to come in at this time due to taking care of her . Pt will call at a later date to schedule the appointment.

## 2022-11-20 ENCOUNTER — HEALTH MAINTENANCE LETTER (OUTPATIENT)
Age: 72
End: 2022-11-20

## 2023-02-10 ENCOUNTER — ANCILLARY PROCEDURE (OUTPATIENT)
Dept: MAMMOGRAPHY | Facility: CLINIC | Age: 73
End: 2023-02-10
Attending: FAMILY MEDICINE
Payer: COMMERCIAL

## 2023-02-10 DIAGNOSIS — Z12.31 ENCOUNTER FOR SCREENING MAMMOGRAM FOR MALIGNANT NEOPLASM OF BREAST: ICD-10-CM

## 2023-02-10 PROCEDURE — 77067 SCR MAMMO BI INCL CAD: CPT | Mod: TC | Performed by: RADIOLOGY

## 2023-03-21 ENCOUNTER — OFFICE VISIT (OUTPATIENT)
Dept: FAMILY MEDICINE | Facility: CLINIC | Age: 73
End: 2023-03-21
Payer: COMMERCIAL

## 2023-03-21 VITALS
DIASTOLIC BLOOD PRESSURE: 86 MMHG | SYSTOLIC BLOOD PRESSURE: 142 MMHG | WEIGHT: 168 LBS | BODY MASS INDEX: 29.77 KG/M2 | HEIGHT: 63 IN | HEART RATE: 67 BPM | RESPIRATION RATE: 16 BRPM | TEMPERATURE: 97.8 F | OXYGEN SATURATION: 97 %

## 2023-03-21 DIAGNOSIS — Z00.00 ENCOUNTER FOR MEDICARE ANNUAL WELLNESS EXAM: Primary | ICD-10-CM

## 2023-03-21 DIAGNOSIS — I10 BENIGN ESSENTIAL HYPERTENSION: ICD-10-CM

## 2023-03-21 DIAGNOSIS — E78.5 HYPERLIPIDEMIA, UNSPECIFIED HYPERLIPIDEMIA TYPE: ICD-10-CM

## 2023-03-21 DIAGNOSIS — R73.02 GLUCOSE INTOLERANCE (IMPAIRED GLUCOSE TOLERANCE): ICD-10-CM

## 2023-03-21 DIAGNOSIS — Z78.0 POST-MENOPAUSAL: ICD-10-CM

## 2023-03-21 LAB
ALBUMIN SERPL BCG-MCNC: 4.8 G/DL (ref 3.5–5.2)
ALP SERPL-CCNC: 57 U/L (ref 35–104)
ALT SERPL W P-5'-P-CCNC: 17 U/L (ref 10–35)
ANION GAP SERPL CALCULATED.3IONS-SCNC: 14 MMOL/L (ref 7–15)
AST SERPL W P-5'-P-CCNC: 23 U/L (ref 10–35)
BILIRUB SERPL-MCNC: 0.4 MG/DL
BUN SERPL-MCNC: 14.2 MG/DL (ref 8–23)
CALCIUM SERPL-MCNC: 9.8 MG/DL (ref 8.8–10.2)
CHLORIDE SERPL-SCNC: 102 MMOL/L (ref 98–107)
CHOLEST SERPL-MCNC: 260 MG/DL
CREAT SERPL-MCNC: 0.73 MG/DL (ref 0.51–0.95)
CREAT UR-MCNC: 16.5 MG/DL
DEPRECATED HCO3 PLAS-SCNC: 22 MMOL/L (ref 22–29)
ERYTHROCYTE [DISTWIDTH] IN BLOOD BY AUTOMATED COUNT: 12.8 % (ref 10–15)
GFR SERPL CREATININE-BSD FRML MDRD: 86 ML/MIN/1.73M2
GLUCOSE SERPL-MCNC: 108 MG/DL (ref 70–99)
HBA1C MFR BLD: 6.2 % (ref 0–5.6)
HCT VFR BLD AUTO: 41.9 % (ref 35–47)
HDLC SERPL-MCNC: 70 MG/DL
HGB BLD-MCNC: 13.8 G/DL (ref 11.7–15.7)
LDLC SERPL CALC-MCNC: 169 MG/DL
MCH RBC QN AUTO: 30.9 PG (ref 26.5–33)
MCHC RBC AUTO-ENTMCNC: 32.9 G/DL (ref 31.5–36.5)
MCV RBC AUTO: 94 FL (ref 78–100)
MICROALBUMIN UR-MCNC: <12 MG/L
MICROALBUMIN/CREAT UR: NORMAL MG/G{CREAT}
NONHDLC SERPL-MCNC: 190 MG/DL
PLATELET # BLD AUTO: 277 10E3/UL (ref 150–450)
POTASSIUM SERPL-SCNC: 3.9 MMOL/L (ref 3.4–5.3)
PROT SERPL-MCNC: 7.7 G/DL (ref 6.4–8.3)
RBC # BLD AUTO: 4.47 10E6/UL (ref 3.8–5.2)
SODIUM SERPL-SCNC: 138 MMOL/L (ref 136–145)
TRIGL SERPL-MCNC: 104 MG/DL
WBC # BLD AUTO: 6.8 10E3/UL (ref 4–11)

## 2023-03-21 PROCEDURE — 80061 LIPID PANEL: CPT | Performed by: FAMILY MEDICINE

## 2023-03-21 PROCEDURE — 36415 COLL VENOUS BLD VENIPUNCTURE: CPT | Performed by: FAMILY MEDICINE

## 2023-03-21 PROCEDURE — 82570 ASSAY OF URINE CREATININE: CPT | Performed by: FAMILY MEDICINE

## 2023-03-21 PROCEDURE — G0439 PPPS, SUBSEQ VISIT: HCPCS | Performed by: FAMILY MEDICINE

## 2023-03-21 PROCEDURE — 83036 HEMOGLOBIN GLYCOSYLATED A1C: CPT | Performed by: FAMILY MEDICINE

## 2023-03-21 PROCEDURE — 85027 COMPLETE CBC AUTOMATED: CPT | Performed by: FAMILY MEDICINE

## 2023-03-21 PROCEDURE — 99214 OFFICE O/P EST MOD 30 MIN: CPT | Mod: 25 | Performed by: FAMILY MEDICINE

## 2023-03-21 PROCEDURE — 82043 UR ALBUMIN QUANTITATIVE: CPT | Performed by: FAMILY MEDICINE

## 2023-03-21 PROCEDURE — 80053 COMPREHEN METABOLIC PANEL: CPT | Performed by: FAMILY MEDICINE

## 2023-03-21 RX ORDER — ACETAMINOPHEN 500 MG
500-1000 TABLET ORAL EVERY 6 HOURS PRN
COMMUNITY

## 2023-03-21 RX ORDER — LISINOPRIL 20 MG/1
20 TABLET ORAL DAILY
Qty: 90 TABLET | Refills: 3 | Status: SHIPPED | OUTPATIENT
Start: 2023-03-21 | End: 2024-03-22

## 2023-03-21 RX ORDER — LISINOPRIL 20 MG/1
20 TABLET ORAL DAILY
Qty: 90 TABLET | Refills: 0 | Status: CANCELLED | OUTPATIENT
Start: 2023-03-21

## 2023-03-21 ASSESSMENT — ENCOUNTER SYMPTOMS
HEARTBURN: 0
NERVOUS/ANXIOUS: 0
JOINT SWELLING: 1
CHILLS: 0
WEAKNESS: 0
BREAST MASS: 0
ARTHRALGIAS: 1
PARESTHESIAS: 0
DYSURIA: 0
COUGH: 0
MYALGIAS: 0
CONSTIPATION: 0
NAUSEA: 0
SORE THROAT: 0
FREQUENCY: 0
FEVER: 0
PALPITATIONS: 0
SHORTNESS OF BREATH: 0
DIARRHEA: 0
DIZZINESS: 0
HEMATURIA: 0
HEMATOCHEZIA: 0
HEADACHES: 0
ABDOMINAL PAIN: 0
EYE PAIN: 0

## 2023-03-21 ASSESSMENT — ACTIVITIES OF DAILY LIVING (ADL): CURRENT_FUNCTION: NO ASSISTANCE NEEDED

## 2023-03-21 NOTE — PATIENT INSTRUCTIONS
Patient Education   Personalized Prevention Plan  You are due for the preventive services outlined below.  Your care team is available to assist you in scheduling these services.  If you have already completed any of these items, please share that information with your care team to update in your medical record.  Health Maintenance Due   Topic Date Due     LUNG CANCER SCREENING  Never done     Zoster (Shingles) Vaccine (2 of 2) 04/06/2020     Osteoporosis Screening  03/01/2022     Annual Wellness Visit  08/23/2022     Kidney Microalbumin Urine Test  08/23/2022     ANNUAL REVIEW OF HM ORDERS  08/23/2022     FALL RISK ASSESSMENT  08/23/2022     PHQ-2 (once per calendar year)  01/01/2023

## 2023-03-21 NOTE — PROGRESS NOTES
"SUBJECTIVE:   Sammi is a 73 year old who presents for Preventive Visit.    Here for [facility] visit.    Needs Lisinopril refilled.  Checks BP at home.  High today due to blood draw, nervous. Pressures at home are typically normal and less than 140/90.    Patient has been advised of split billing requirements and indicates understanding: Yes  Are you in the first 12 months of your Medicare coverage?  No    Healthy Habits:     In general, how would you rate your overall health?  Excellent    Frequency of exercise:  6-7 days/week    Duration of exercise:  15-30 minutes    Do you usually eat at least 4 servings of fruit and vegetables a day, include whole grains    & fiber and avoid regularly eating high fat or \"junk\" foods?  Yes    Taking medications regularly:  Yes    Medication side effects:  None    Ability to successfully perform activities of daily living:  No assistance needed    Home Safety:  No safety concerns identified    Hearing Impairment:  Difficulty following a conversation in a noisy restaurant or crowded room, feel that people are mumbling or not speaking clearly, difficulty following dialogue in the theater, difficult to understand a speaker at a public meeting or Islam service, need to ask people to speak up or repeat themselves, find that men's voices are easier to understand than woman's, difficulty understanding soft or whispered speech and difficulty understanding speech on the telephone    In the past 6 months, have you been bothered by leaking of urine?  No    In general, how would you rate your overall mental or emotional health?  Excellent      PHQ-2 Total Score: 0    Additional concerns today:  No    Changing EYE doctor  Patient wants to know how long she has been on lisinopril/ patient would like  enough for 3 weeks for when going to [facility] and would like enough refills.      Have you ever done Advance Care Planning? (For example, a Health Directive, POLST, or a discussion with a " medical provider or your loved ones about your wishes): Yes, patient states has an Advance Care Planning document and will bring a copy to the clinic.       Fall risk  Fallen 2 or more times in the past year?: No  Any fall with injury in the past year?: No    Cognitive Screening   1) Repeat 3 items (Leader, Season, Table)    2) Clock draw: NORMAL  3) 3 item recall: Recalls 2 objects   Results: NORMAL clock, 1-2 items recalled: COGNITIVE IMPAIRMENT LESS LIKELY    Mini-CogTM Copyright LOLA Moore. Licensed by the author for use in [facility]; reprinted with permission (john@[facility]). All rights reserved.      Do you have sleep apnea, excessive snoring or daytime drowsiness?: no    Reviewed and updated as needed this visit by clinical staff   Tobacco  Allergies  Meds   Med Hx  Surg Hx  Fam Hx          Reviewed and updated as needed this visit by Provider   Tobacco  Allergies  Meds   Med Hx  Surg Hx  Fam Hx         Social History     Tobacco Use     Smoking status: Former     Packs/day: 1.00     Years: 17.00     Pack years: 17.00     Types: Cigarettes     Start date: 1969     Quit date: 1986     Years since quittin.2     Smokeless tobacco: Never   Substance Use Topics     Alcohol use: No         Alcohol Use 3/21/2023   Prescreen: >3 drinks/day or >7 drinks/week? No     Current providers sharing in care for this patient include:   Patient Care Team:  Sofia Rockwell MD as PCP - General (Family Medicine)  Pattie Little MD as Assigned PCP    The following health maintenance items are reviewed in Epic and correct as of today:  Health Maintenance   Topic Date Due     DEXA  2022     MICROALBUMIN  2022     MAMMO SCREENING  02/10/2024     MEDICARE ANNUAL WELLNESS VISIT  2024     ANNUAL REVIEW OF HM ORDERS  2024     FALL RISK ASSESSMENT  2024     LIPID  2026     COLORECTAL CANCER SCREENING  2027     DTAP/TDAP/TD IMMUNIZATION (3  - Td or Tdap) 2027     ADVANCE CARE PLANNING  2028     HEPATITIS C SCREENING  Completed     PHQ-2 (once per calendar year)  Completed     INFLUENZA VACCINE  Completed     Pneumococcal Vaccine: 65+ Years  Completed     COVID-19 Vaccine  Completed     IPV IMMUNIZATION  Aged Out     MENINGITIS IMMUNIZATION  Aged Out     ZOSTER IMMUNIZATION  Discontinued     Lab work is in process  Labs reviewed in EPIC  BP Readings from Last 3 Encounters:   23 (!) 160/92   21 (!) 138/90   02/10/20 116/72    Wt Readings from Last 3 Encounters:   23 76.2 kg (168 lb)   21 76.9 kg (169 lb 9.6 oz)   02/10/20 73 kg (161 lb)                  Patient Active Problem List   Diagnosis     Hyperlipidemia     Osteopenia     Glucose intolerance (impaired glucose tolerance)     Family history of diabetes mellitus     HL (hearing loss)     Diverticulosis     Advanced directives, counseling/discussion     Overweight (BMI 25.0-29.9)     OA (osteoarthritis) of knee     Benign essential hypertension     Hx of seborrheic keratosis     Solar lentigo     Cherry angioma     Osteoarthritis of right hand, unspecified osteoarthritis type     Past Surgical History:   Procedure Laterality Date     COLONOSCOPY N/A 2017    Procedure: COLONOSCOPY;  Surgeon: Lenin Ferreira MD;  Location: [facility]     TONSILLECTOMY      at younger age-?4-5       Social History     Tobacco Use     Smoking status: Former     Packs/day: 1.00     Years: 17.00     Pack years: 17.00     Types: Cigarettes     Start date: 1969     Quit date: 1986     Years since quittin.2     Smokeless tobacco: Never   Substance Use Topics     Alcohol use: No     Family History   Problem Relation Age of Onset     Diabetes Mother         alive 88 in 2010,stroke in      Psychotic Disorder Mother         Dementia     Hypertension Mother      Cancer Father         liver ca     Family History Negative Maternal Grandmother      Heart Disease Maternal  Grandfather         heart attack at age of ?AGE     Cancer Paternal Grandmother         not known details     Family History Negative Paternal Grandfather          from old age     Family History Negative Brother         alive 57     Breast Cancer No family hx of      Cancer - colorectal No family hx of          Current Outpatient Medications   Medication Sig Dispense Refill     acetaminophen (TYLENOL) 500 MG tablet Take 500-1,000 mg by mouth every 6 hours as needed       CALCIUM 600 + D 600-200 MG-UNIT OR TABS 2 q d 3 MONTHS 1 YEAR     lisinopril (ZESTRIL) 20 MG tablet Take 1 tablet (20 mg) by mouth daily 90 tablet 3     trolamine salicylate (ASPERCREME) 10 % external cream Apply topically as needed       Allergies   Allergen Reactions     Hctz [Hydrochlorothiazide] Other (See Comments)     Blurry vision      No Known Drug Allergy      Recent Labs   Lab Test 21  0945 20  0759 19  1013 19  1012 17  0800 03/03/15  0751   A1C 5.9* 5.9*  --  5.7*   < >  --    * 127* 140*  --    < > 114   HDL 67 67 68  --    < > 68   TRIG 122 98 101  --    < > 78   ALT 23 20 41  --    < > 54*   CR 0.73 0.74 0.75  --    < > 0.77   GFRESTIMATED 83 83 82  --    < > 75   GFRESTBLACK  --  >90 >90  --    < > >90   GFR Calc     POTASSIUM 3.8 4.0 4.0  --    < > 4.0   TSH  --   --   --   --   --  2.31    < > = values in this interval not displayed.      Mammogram Screening: Mammogram Screening: Recommended mammography every 1-2 years with patient discussion and risk factor consideration        Review of Systems   Constitutional: Negative for chills and fever.   HENT: Positive for hearing loss. Negative for congestion, ear pain and sore throat.    Eyes: Negative for pain and visual disturbance.   Respiratory: Negative for cough and shortness of breath.    Cardiovascular: Negative for chest pain, palpitations and peripheral edema.   Gastrointestinal: Negative for abdominal pain,  "constipation, diarrhea, heartburn, hematochezia and nausea.   Breasts:  Negative for tenderness, breast mass and discharge.   Genitourinary: Negative for dysuria, frequency, genital sores, hematuria, pelvic pain, urgency, vaginal bleeding and vaginal discharge.   Musculoskeletal: Positive for arthralgias and joint swelling. Negative for myalgias.   Skin: Negative for rash.   Neurological: Negative for dizziness, weakness, headaches and paresthesias.   Psychiatric/Behavioral: Negative for mood changes. The patient is not nervous/anxious.          OBJECTIVE:   BP (!) 160/92 (BP Location: Right arm, Patient Position: Sitting, Cuff Size: Adult Regular)   Pulse 67   Temp 97.8  F (36.6  C) (Oral)   Resp 16   Ht 1.594 m (5' 2.76\")   Wt 76.2 kg (168 lb)   SpO2 97%   BMI 29.99 kg/m   Estimated body mass index is 29.99 kg/m  as calculated from the following:    Height as of this encounter: 1.594 m (5' 2.76\").    Weight as of this encounter: 76.2 kg (168 lb).  Physical Exam  GENERAL: healthy, alert and no distress  EYES: Eyes grossly normal to inspection, PERRL and conjunctivae and sclerae normal  HENT: ear canals and TM's normal, nose and mouth without ulcers or lesions  RESP: lungs clear to auscultation - no rales, rhonchi or wheezes  CV: regular rate and rhythm, normal S1 S2, no S3 or S4, no murmur, click or rub, no peripheral edema and peripheral pulses strong  ABDOMEN: soft, nontender, no hepatosplenomegaly, no masses and bowel sounds normal  MS: no gross musculoskeletal defects noted, no edema  SKIN: no suspicious lesions or rashes  NEURO: Normal strength and tone, mentation intact and speech normal  PSYCH: mentation appears normal, affect normal/bright    Diagnostic Test Results:  Labs reviewed in Epic    ASSESSMENT / PLAN:   (Z00.00) Encounter for Medicare annual wellness exam  (primary encounter diagnosis)  Comment: Exam completed today, routine health maintenance items updated as able.  Labs ordered.  Follow " up one year or sooner as needed.  Plan: Comprehensive metabolic panel (BMP + Alb, Alk         Phos, ALT, AST, Total. Bili, TP), Albumin         Random Urine Quantitative with Creat Ratio, CBC        with platelets            (I10) Benign essential hypertension  Comment: Controlled at home, recommend continued monitoring.  Continue lisinopril.  Plan: lisinopril (ZESTRIL) 20 MG tablet            (E78.5) Hyperlipidemia, unspecified hyperlipidemia type  Comment: due for labs, Recommendations pending results  Plan: Lipid panel reflex to direct LDL Fasting            (R73.02) Glucose intolerance (impaired glucose tolerance)  Comment: due for labs, Recommendations pending results  Plan: Hemoglobin A1c           (Z78.0) Post-menopausal  Plan: DX Hip/Pelvis/Spine              Patient has been advised of split billing requirements and indicates understanding: Yes      COUNSELING:  Reviewed preventive health counseling, as reflected in patient instructions        She reports that she quit smoking about 37 years ago. Her smoking use included cigarettes. She started smoking about 54 years ago. She has a 17.00 pack-year smoking history. She has never used smokeless tobacco.      Appropriate preventive services were discussed with this patient, including applicable screening as appropriate for cardiovascular disease, diabetes, osteopenia/osteoporosis, and glaucoma.  As appropriate for age/gender, discussed screening for colorectal cancer, prostate cancer, breast cancer, and cervical cancer. Checklist reviewing preventive services available has been given to the patient.    Reviewed patients plan of care and provided an AVS. The Basic Care Plan (routine screening as documented in Health Maintenance) for Sammi meets the Care Plan requirement. This Care Plan has been established and reviewed with the Patient.      Sofia Chavez MD  [facility]    Identified Health Risks:

## 2023-05-04 ENCOUNTER — ANCILLARY PROCEDURE (OUTPATIENT)
Dept: BONE DENSITY | Facility: CLINIC | Age: 73
End: 2023-05-04
Attending: FAMILY MEDICINE
Payer: COMMERCIAL

## 2023-05-04 DIAGNOSIS — Z78.0 POST-MENOPAUSAL: ICD-10-CM

## 2023-05-04 PROCEDURE — 77085 DXA BONE DENSITY AXL VRT FX: CPT | Performed by: INTERNAL MEDICINE

## 2023-07-24 ENCOUNTER — OFFICE VISIT (OUTPATIENT)
Dept: FAMILY MEDICINE | Facility: CLINIC | Age: 73
End: 2023-07-24
Payer: COMMERCIAL

## 2023-07-24 VITALS
WEIGHT: 167.8 LBS | HEART RATE: 83 BPM | RESPIRATION RATE: 16 BRPM | TEMPERATURE: 98.4 F | SYSTOLIC BLOOD PRESSURE: 115 MMHG | HEIGHT: 63 IN | DIASTOLIC BLOOD PRESSURE: 76 MMHG | OXYGEN SATURATION: 98 % | BODY MASS INDEX: 29.73 KG/M2

## 2023-07-24 DIAGNOSIS — M85.89 OSTEOPENIA OF MULTIPLE SITES: Primary | ICD-10-CM

## 2023-07-24 PROCEDURE — 99213 OFFICE O/P EST LOW 20 MIN: CPT | Performed by: FAMILY MEDICINE

## 2023-07-24 RX ORDER — LATANOPROST 50 UG/ML
SOLUTION/ DROPS OPHTHALMIC
COMMUNITY
Start: 2023-06-01

## 2023-07-24 RX ORDER — AMOXICILLIN AND CLAVULANATE POTASSIUM 500; 125 MG/1; MG/1
TABLET, FILM COATED ORAL
COMMUNITY
Start: 2023-07-22

## 2023-07-24 RX ORDER — ALENDRONATE SODIUM 10 MG/1
10 TABLET ORAL
Qty: 90 TABLET | Refills: 3 | Status: SHIPPED | OUTPATIENT
Start: 2023-07-24

## 2023-07-24 ASSESSMENT — PAIN SCALES - GENERAL: PAINLEVEL: NO PAIN (0)

## 2023-07-24 NOTE — PROGRESS NOTES
"  Assessment & Plan     Osteopenia of multiple sites  Will start daily fosamax, follow up in one year or sooner as needed for medication refill.  DEXA in 2 years.  - alendronate (FOSAMAX) 10 MG tablet; Take 1 tablet (10 mg) by mouth every morning (before breakfast)    20 minutes spent by me on the date of the encounter doing chart review, history and exam, documentation and further activities per the note       BMI:   Estimated body mass index is 29.95 kg/m  as calculated from the following:    Height as of this encounter: 1.594 m (5' 2.76\").    Weight as of this encounter: 76.1 kg (167 lb 12.8 oz).       See Patient Instructions    Sofia Chavez MD  [facility]    Isabel Chapa is a 73 year old, presenting for the following health issues:  Results (Review Dexa scan)        7/24/2023    12:39 PM   Additional Questions   Roomed by Beth YANG CMA     History of Present Illness       Back Pain:  She presents for follow up of back pain. Patient's back pain is a new problem.    Original cause of back pain: other  First noticed back pain: 1-4 weeks ago  Patient feels back pain: a few times per weekLocation of back pain:  Other  Description of back pain: dull ache  Back pain spreads: nowhere    Since patient first noticed back pain, pain is: unchanged  Does back pain interfere with her job:  No  On a scale of 1-10 (10 being the worst), patient describes pain as:  1  What makes back pain worse: lying down  Acupuncture: not tried  Acetaminophen: helpful  Chiropractor:  Not tried  Cold: not tried  Heat: not tried  Massage: not tried  Muscle relaxants: not tried  NSAIDS: not tried  Opioids: not tried  Physical Therapy: not tried  Rest: not tried  Steroid Injection: not tried  Stretching: not tried  Surgery: not tried  TENS unit: not tried  Topical pain relievers: not tried  Other healthcare providers patient is seeing for back pain: None    She eats 4 or more servings of fruits and " Consult completed, CVC inserted by critical care team.     Please consult IV/PICC team for questions, concerns, or patient's needs change. "vegetables daily.She consumes 0 sweetened beverage(s) daily.She exercises with enough effort to increase her heart rate 9 or less minutes per day.  She exercises with enough effort to increase her heart rate 3 or less days per week.   She is taking medications regularly.       Review Dexa results.    Thinks she may have had a reaction to the Augmentin, made blood pressure go up, vomiting, headache and slept 19 hours yesterday. She was prescribed for left eye infection \"a plugged oil duct\".       Review of Systems   Constitutional, HEENT, cardiovascular, pulmonary, gi and gu systems are negative, except as otherwise noted.      Objective    /76 (BP Location: Right arm, Patient Position: Sitting, Cuff Size: Adult Regular)   Pulse 83   Temp 98.4  F (36.9  C) (Oral)   Resp 16   Ht 1.594 m (5' 2.76\")   Wt 76.1 kg (167 lb 12.8 oz)   SpO2 98%   BMI 29.95 kg/m    Body mass index is 29.95 kg/m .  Physical Exam   GENERAL: healthy, alert and no distress  PSYCH: mentation appears normal, affect normal/bright    Bone Density Scan Reviewed                "

## 2023-08-18 ENCOUNTER — NURSE TRIAGE (OUTPATIENT)
Dept: FAMILY MEDICINE | Facility: CLINIC | Age: 73
End: 2023-08-18
Payer: COMMERCIAL

## 2023-08-18 NOTE — TELEPHONE ENCOUNTER
"Called pt and relayed provider message below. Patient was given an opportunity to ask questions, verbalized understanding of plan, and is agreeable.    Pt states \"I can't schedule now. I will call back to get scheduled\"    Amanda COLBERT RN    "

## 2023-08-18 NOTE — TELEPHONE ENCOUNTER
Patient reports she has had severe muscular pain since starting her daily Fosomax. She reports her R shoulder, R hand, and arm has been more painful and it Is hard to lift it up and do normal tasks. She also complains her legs and ankles are having more muscular pains as well. She is using Tylenol, heat,ice and her home massager and nothing is relieving her pain. She is wondering if she can stop the medication and try something else?     YAS Tavares, RN    Reason for Disposition   Caller has NON-URGENT medicine question about med that PCP or specialist prescribed and triager unable to answer question    Additional Information   Negative: MORE THAN A DOUBLE DOSE of a prescription or over-the-counter (OTC) drug   Negative: DOUBLE DOSE (an extra dose or lesser amount) of prescription drug and any symptoms (e.g., dizziness, nausea, pain, sleepiness)   Negative: DOUBLE DOSE (an extra dose or lesser amount) of over-the-counter (OTC) drug and any symptoms (e.g., dizziness, nausea, pain, sleepiness)   Negative: Took another person's prescription drug   Negative: DOUBLE DOSE (an extra dose or lesser amount) of prescription drug and NO symptoms  (Exception: A double dose of antibiotics.)   Negative: Diabetes drug error or overdose (e.g., took wrong type of insulin or took extra dose)   Negative: Prescription not at pharmacy and was prescribed by PCP recently  (Exception: triager has access to EMR and prescription is recorded there. Go to Home Care and confirm for pharmacy.)   Negative: Pharmacy calling with prescription question and triager unable to answer question   Negative: Caller has URGENT medicine question about med that PCP or specialist prescribed and triager unable to answer question   Negative: Drug overdose and triager unable to answer question   Negative: Caller requesting a renewal or refill of a medicine patient is currently taking   Negative: Caller requesting information unrelated to medicine    "Negative: Caller requesting information about COVID-19 Vaccine   Negative: Caller requesting information about Emergency Contraception   Negative: Caller requesting information about Combined Birth Control Pills   Negative: Caller requesting information about Progestin Birth Control Pills   Negative: Caller requesting information about Post-Op pain or medicines   Negative: Caller requesting a prescription antibiotic (such as penicillin) for Strep throat and has a positive culture result   Negative: Caller requesting a prescription anti-viral med (such as Tamiflu) and has influenza (flu) symptoms   Negative: Immunization reaction suspected   Negative: Rash while taking a medicine or within 3 days of stopping it   Negative: Asthma and having symptoms of asthma (cough, wheezing, etc.)   Negative: Symptom of illness (e.g., headache, abdominal pain, earache, vomiting) that are more than mild   Negative: Breastfeeding questions about mother's medicines and diet   Negative: Intentional drug overdose and suicidal thoughts or ideas    Answer Assessment - Initial Assessment Questions  1. NAME of MEDICATION: \"What medicine are you calling about?\"      Fosomax (Alendronate)  2. QUESTION: \"What is your question?\" (e.g., double dose of medicine, side effect)      Patient reports since she started taking it she is experiencing worseing muscle aches and pains especially in her R shoulder and arm and legs and ankles  3. PRESCRIBING HCP: \"Who prescribed it?\" Reason: if prescribed by specialist, call should be referred to that group.      PCP  4. SYMPTOMS: \"Do you have any symptoms?\"      Yes, increasing muscular pains not relieved by Tylenol, heat, ice or her massager  5. SEVERITY: If symptoms are present, ask \"Are they mild, moderate or severe?\"      Moderate to severe. Severe in her shouler  6. PREGNANCY:  \"Is there any chance that you are pregnant?\" \"When was your last menstrual period?\"      no    Protocols used: Medication " Question Call-A-[place]

## 2023-08-18 NOTE — TELEPHONE ENCOUNTER
Provider Response to 2nd Level Triage Request    I have reviewed the RN documentation. My recommendation is:  Stop medication. Follow up with virtual visit to discuss alternative medication.  Monitor for improvement or change once stopping the medication.

## 2024-01-18 ENCOUNTER — PATIENT OUTREACH (OUTPATIENT)
Dept: CARE COORDINATION | Facility: CLINIC | Age: 74
End: 2024-01-18
Payer: COMMERCIAL

## 2024-02-20 ENCOUNTER — PATIENT OUTREACH (OUTPATIENT)
Dept: CARE COORDINATION | Facility: CLINIC | Age: 74
End: 2024-02-20
Payer: COMMERCIAL

## 2024-02-22 ENCOUNTER — ANCILLARY PROCEDURE (OUTPATIENT)
Dept: MAMMOGRAPHY | Facility: CLINIC | Age: 74
End: 2024-02-22
Attending: FAMILY MEDICINE
Payer: COMMERCIAL

## 2024-02-22 DIAGNOSIS — Z12.31 VISIT FOR SCREENING MAMMOGRAM: ICD-10-CM

## 2024-02-22 PROCEDURE — 77067 SCR MAMMO BI INCL CAD: CPT | Mod: TC | Performed by: RADIOLOGY

## 2024-03-05 ENCOUNTER — PATIENT OUTREACH (OUTPATIENT)
Dept: CARE COORDINATION | Facility: CLINIC | Age: 74
End: 2024-03-05
Payer: COMMERCIAL

## 2024-03-22 DIAGNOSIS — I10 BENIGN ESSENTIAL HYPERTENSION: ICD-10-CM

## 2024-03-22 RX ORDER — LISINOPRIL 20 MG/1
20 TABLET ORAL DAILY
Qty: 90 TABLET | Refills: 0 | Status: SHIPPED | OUTPATIENT
Start: 2024-03-22 | End: 2024-07-18

## 2024-04-29 ENCOUNTER — TRANSFERRED RECORDS (OUTPATIENT)
Dept: HEALTH INFORMATION MANAGEMENT | Facility: CLINIC | Age: 74
End: 2024-04-29
Payer: COMMERCIAL

## 2024-05-05 ENCOUNTER — TRANSFERRED RECORDS (OUTPATIENT)
Dept: HEALTH INFORMATION MANAGEMENT | Facility: CLINIC | Age: 74
End: 2024-05-05
Payer: COMMERCIAL

## 2024-06-16 ENCOUNTER — HEALTH MAINTENANCE LETTER (OUTPATIENT)
Age: 74
End: 2024-06-16

## 2024-07-02 ENCOUNTER — PATIENT OUTREACH (OUTPATIENT)
Dept: FAMILY MEDICINE | Facility: CLINIC | Age: 74
End: 2024-07-02
Payer: COMMERCIAL

## 2024-07-02 NOTE — LETTER
July 2, 2024    To  Sammi Manzano  [id number]  [place] [address]        Your team at [facility] cares about your health. We have reviewed your chart and based on our findings; we are making the following recommendations to better manage your health.     You are in particular need of attention regarding the following:     PREVENTATIVE VISIT: Annual Medicare Wellness:Schedule an Annual Medicare Wellness Exam. Please call your [facility] clinic to set up your appointment.    If you have already completed these items or are seen by another provider, please contact the clinic via phone at [phone] or [employer] so your care team can review and update your records. Thank you for choosing [facility] for your healthcare needs. For any questions, concerns, or to schedule an appointment please contact our clinic.    Healthy Regards,      April Coming-MD Scott

## 2024-07-02 NOTE — TELEPHONE ENCOUNTER
Patient Quality Outreach    Patient is due for the following:   Physical Annual Wellness Visit      Topic Date Due    COVID-19 Vaccine (7 - 2023-24 season) 02/06/2024       Next Steps:   Schedule a Annual Wellness Visit    Type of outreach:    Sent letter.    Next Steps:  Reach out within 90 days via Letter.    Max number of attempts reached: No. Will try again in 90 days if patient still on fail list.    Questions for provider review:    None           Consuelo Schmitz, CMA

## 2024-07-09 ENCOUNTER — TRANSFERRED RECORDS (OUTPATIENT)
Dept: HEALTH INFORMATION MANAGEMENT | Facility: CLINIC | Age: 74
End: 2024-07-09
Payer: COMMERCIAL

## 2024-07-18 DIAGNOSIS — I10 BENIGN ESSENTIAL HYPERTENSION: ICD-10-CM

## 2024-07-18 RX ORDER — LISINOPRIL 20 MG/1
20 TABLET ORAL DAILY
Qty: 90 TABLET | Refills: 0 | Status: SHIPPED | OUTPATIENT
Start: 2024-07-18

## 2024-07-18 RX ORDER — LISINOPRIL 20 MG/1
20 TABLET ORAL DAILY
Qty: 90 TABLET | Refills: 0 | OUTPATIENT
Start: 2024-07-18

## 2024-07-18 NOTE — TELEPHONE ENCOUNTER
Pt is requesting lisinopril refill. Pt states she knows she is due for a visit but she just had facial surgery d/t cancer. Advised pt to schedule in advance a time that works for her. Pt verbalized understanding. Routing refill request to refill pool for processing.   Isauro Maza RN on 7/18/2024 at 7:51 AM

## 2024-07-18 NOTE — TELEPHONE ENCOUNTER
Dr. JAEL badillo. Will provide 90 day shahana refill. Please help schedule for med check visit/ or AWV.    HARRY Pancahl CNP

## 2024-07-18 NOTE — TELEPHONE ENCOUNTER
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  Snow Soto, TC

## 2024-09-17 ENCOUNTER — PATIENT OUTREACH (OUTPATIENT)
Dept: CARE COORDINATION | Facility: CLINIC | Age: 74
End: 2024-09-17
Payer: COMMERCIAL

## 2024-11-15 DIAGNOSIS — I10 BENIGN ESSENTIAL HYPERTENSION: ICD-10-CM

## 2024-11-15 NOTE — TELEPHONE ENCOUNTER
Patient calls requesting a refill of Lisinopril, she has a pending appointment in March but is almost out of medication. Routing to refill pool.    Breonna Perez RN on 11/15/2024 at 9:01 AM

## 2024-11-18 RX ORDER — LISINOPRIL 20 MG/1
20 TABLET ORAL DAILY
Qty: 90 TABLET | Refills: 0 | OUTPATIENT
Start: 2024-11-18

## 2024-11-19 ENCOUNTER — TELEPHONE (OUTPATIENT)
Dept: FAMILY MEDICINE | Facility: CLINIC | Age: 74
End: 2024-11-19
Payer: COMMERCIAL

## 2024-11-19 NOTE — TELEPHONE ENCOUNTER
"Scheduled 11/22/24 with Maureen.  See below.  Lindsay Munoz, Isauro Ortega, RN   MR    11/19/24  5:29 PM  Note  Pt calls to request lisinopril refill. Reiterated prior review by PCP and recommendations for sooner visit needed for refill. Pt reports she has 7 days left of medication. Scheduled pt to be seen per request Friday with provider.      Per chart review:  \"Coming Sofia Chavez MD to [facility] Pool  11/18/24  2:33 PM  Needs sooner appointment for refills, can see any provider\"     Isauro Maza, RN on 11/19/2024 at 5:29 PM        "

## 2024-11-19 NOTE — TELEPHONE ENCOUNTER
"Pt calls to request lisinopril refill. Reiterated prior review by PCP and recommendations for sooner visit needed for refill. Pt reports she has 7 days left of medication. Scheduled pt to be seen per request Friday with provider.     Per chart review:  \"Coming Sofia Chavez MD to [facility]  11/18/24  2:33 PM  Needs sooner appointment for refills, can see any provider\"    Isauro Maza RN on 11/19/2024 at 5:29 PM    "

## 2024-11-19 NOTE — TELEPHONE ENCOUNTER
Called patient to schedule an appointment, patient requests to speak with a nurse, she thinks she was told by a nurse March is ok and refills will be given? Ruth Behrens.

## 2024-11-22 PROBLEM — E11.9 DIABETES MELLITUS, TYPE 2 (H): Status: ACTIVE | Noted: 2024-11-22

## 2024-11-22 PROBLEM — C44.310 BCC (BASAL CELL CARCINOMA), FACE: Status: ACTIVE | Noted: 2024-11-22

## 2024-11-27 ENCOUNTER — OFFICE VISIT (OUTPATIENT)
Dept: FAMILY MEDICINE | Facility: CLINIC | Age: 74
End: 2024-11-27
Payer: COMMERCIAL

## 2024-11-27 VITALS
RESPIRATION RATE: 18 BRPM | SYSTOLIC BLOOD PRESSURE: 191 MMHG | HEART RATE: 55 BPM | BODY MASS INDEX: 30.62 KG/M2 | OXYGEN SATURATION: 96 % | WEIGHT: 172.8 LBS | TEMPERATURE: 97.9 F | DIASTOLIC BLOOD PRESSURE: 108 MMHG | HEIGHT: 63 IN

## 2024-11-27 DIAGNOSIS — I10 BENIGN ESSENTIAL HYPERTENSION: ICD-10-CM

## 2024-11-27 DIAGNOSIS — E78.5 HYPERLIPIDEMIA, UNSPECIFIED HYPERLIPIDEMIA TYPE: ICD-10-CM

## 2024-11-27 DIAGNOSIS — E11.9 TYPE 2 DIABETES MELLITUS WITHOUT COMPLICATION, WITHOUT LONG-TERM CURRENT USE OF INSULIN (H): Primary | ICD-10-CM

## 2024-11-27 PROCEDURE — 99214 OFFICE O/P EST MOD 30 MIN: CPT | Performed by: PHYSICIAN ASSISTANT

## 2024-11-27 PROCEDURE — 99207 PR FOOT EXAM NO CHARGE: CPT | Performed by: PHYSICIAN ASSISTANT

## 2024-11-27 RX ORDER — ROSUVASTATIN CALCIUM 10 MG/1
10 TABLET, COATED ORAL DAILY
Qty: 30 TABLET | Refills: 5 | Status: SHIPPED | OUTPATIENT
Start: 2024-11-27

## 2024-11-27 NOTE — PROGRESS NOTES
"  Assessment & Plan     Type 2 diabetes mellitus without complication, without long-term current use of insulin (H)  Foot exam completed today. Discussed carb counting and diabetes education referral placed.  Started on Crestor. Already taking lisinopril for kidney protection. Could consider adjustment if blood pressure remains high.  - FOOT EXAM  - Adult Diabetes Education  Referral; Future  - rosuvastatin (CRESTOR) 10 MG tablet; Take 1 tablet (10 mg) by mouth daily.    Hyperlipidemia, unspecified hyperlipidemia type  Due to diabetes and recent labs patient would benefit from statin. Started a noted below.  The 10-year ASCVD risk score (Rowdy DOS SANTOS, et al., 2019) is: 60.6%    Values used to calculate the score:      Age: 74 years      Sex: Female      Is Non- : No      Diabetic: Yes      Tobacco smoker: No      Systolic Blood Pressure: 191 mmHg      Is BP treated: Yes      HDL Cholesterol: 60 mg/dL      Total Cholesterol: 225 mg/dL    - rosuvastatin (CRESTOR) 10 MG tablet; Take 1 tablet (10 mg) by mouth daily.    Benign essential hypertension  High today but patient is very worked up.  recently admitted and not doing well.    Review of external notes as documented elsewhere in note  Review of the result(s) of each unique test - A1c, lipid, CMP  Prescription drug management        Subjective   Sammi is a 74 year old, presenting for the following health issues:  Hypertension        11/27/2024     9:34 AM   Additional Questions   Roomed by Nikki Bermudez     History of Present Illness       Reason for visit:  Follow up visit   She is taking medications regularly.       Patient is in to discuss lab results from 11/22/2024          Objective    BP (!) 191/108 (BP Location: Left arm, Patient Position: Sitting, Cuff Size: Adult Regular)   Pulse 55   Temp 97.9  F (36.6  C) (Oral)   Resp 18   Ht 1.6 m (5' 3\")   Wt 78.4 kg (172 lb 12.8 oz)   SpO2 96%   BMI 30.61 kg/m    Body mass " index is 30.61 kg/m .  Physical Exam   GENERAL: No acute distress  HEENT: Normocephalic  VASCULAR: 2+ DP and PT pulses bilaterally  EXTREMITIES:   Right lower extremity- foot: No obvious deformity, no edema or ecchymosis of the foot. Sensation present with monofilament testing. No ulcers, skin breakdown or open wounds.  Left lower extremity- foot: No obvious deformity, no edema or ecchymosis of the foot. Sensation present with monofilament testing. No ulcers, skin breakdown or open wounds.  NEURO: Alert, non-focal          Signed Electronically by: Maureen Carter PA-C

## 2024-12-04 ENCOUNTER — VIRTUAL VISIT (OUTPATIENT)
Dept: EDUCATION SERVICES | Facility: CLINIC | Age: 74
End: 2024-12-04
Attending: PHYSICIAN ASSISTANT
Payer: COMMERCIAL

## 2024-12-04 DIAGNOSIS — E11.9 TYPE 2 DIABETES MELLITUS WITHOUT COMPLICATION, WITHOUT LONG-TERM CURRENT USE OF INSULIN (H): ICD-10-CM

## 2024-12-04 PROCEDURE — G0108 DIAB MANAGE TRN  PER INDIV: HCPCS | Mod: 93 | Performed by: DIETITIAN, REGISTERED

## 2024-12-04 NOTE — PROGRESS NOTES
Diabetes Self-Management Education & Support/ 41 minutes    Presents for: Initial Assessment for new diagnosis    Type of Service: Telephone Visit    Originating Location (Patient Location): Home  Distant Location (Provider Location): [facility]  Mode of Communication:  Telephone    Telephone Visit Start Time:  8:30  Telephone Visit End Time (telephone visit stop time): 9:11    How would patient like to obtain AVS? Mail a copy      ASSESSMENT:  Sammi has had prediabetes for at least 12 years. She has a family history of diabetes with her mother and maternal great grandmother. Sammi is currently not taking any medication for diabetes nor is she checking her blood glucose at home. She has had a stressful year with her own health issues and with health issues for her spouse. Sammi has not been able to maintain a regular routine of exercise, she was recently doing physical therapy exercises to strengthen her back and shoulders: 30-40 minutes 3-4x/week, but her spouse was in the hospital for the last week and she fell off track. Sammi's weight decreased 7# during the hosptial visit for her spouse, she would like to lose additional weight. She has been under quite a bit of stress with her husbands health concerns and sleep is currently poor, with him waking every 2 hours overnight.  Sammi does allow herself to nap in the afternoon and she takes time to read for relaxation. She has a good support system with family and friends. Sammi consumes two main meals/day: protein, lots of vegetables and fresh fruit, limited CHO foods, no processed foods or fast food, no sugary beverages.     Patient's most recent   Lab Results   Component Value Date    A1C 6.7 11/22/2024    A1C 5.9 02/13/2020     is meeting goal of <7.0    Diabetes knowledge and skills assessment:   Patient is knowledgeable in diabetes management concepts related to: new diagnosis, topics reviewed    Continue education with the following  "diabetes management concepts: per patient request or need    Based on learning assessment above, most appropriate setting for further diabetes education would be: Individual setting.      PLAN  Maintain weight at 164# give or take 2# for the next two weeks, then aim for an additional 5% (8#) weight loss over the next 3-6 months.  Resume physical therapy exercises for back and shoulder: 30-40 minutes once weekly, increase to 3-4x/week as able.  Following the healthy plate guidelines( sent in the mail).  Aim for a minimum of 6 hours/night for sleep as able and allow yourself a nap in the afternoon when sleep is interruptive overnight.  Allow time each day for reading and relaxation.  Schedule a diabetic eye exam.   Schedule a dental cleaning if you have not had one done in the last six months.    Topics to cover at upcoming visits: per patient request or need    Follow-up: PRN    See Care Plan for co-developed, patient-state behavior change goals.  AVS provided for patient today.    Education Materials Provided:  Living Healthy with Diabetes and My Plate Planner      SUBJECTIVE/OBJECTIVE:  Presents for: Initial Assessment for new diagnosis  Accompanied by: Self  Diabetes education in the past 24mo: No  Focus of Visit: Healthy Coping, Healthy Eating, Being Active, Diabetes Pathophysiology  Diabetes type: Type 2  Date of diagnosis: 11/22/24  Transportation concerns: No  Other concerns:: None  Cultural Influences/Ethnic Background:  Not  or       Diabetes Symptoms & Complications:  Weight trend: Decreasing (7# in the last week with spouse in the hospital)       Patient Problem List and Family Medical History reviewed for relevant medical history, current medical status, and diabetes risk factors.    Vitals:  There were no vitals taken for this visit.  Estimated body mass index is 30.61 kg/m  as calculated from the following:    Height as of 11/27/24: 1.6 m (5' 3\").    Weight as of 11/27/24: 78.4 kg (172 lb " "12.8 oz).   Last 3 BP:   BP Readings from Last 3 Encounters:   11/27/24 (!) 191/108   11/22/24 138/86   07/24/23 115/76       History   Smoking Status    Former    Packs/day: 1.00    Years: 17.00    Types: Cigarettes    Start date: 1/1/1969    Quit date: 1/1/1986   Smokeless Tobacco    Never       Labs:  Lab Results   Component Value Date    A1C 6.7 11/22/2024    A1C 5.9 02/13/2020     Lab Results   Component Value Date     11/22/2024     08/23/2021     02/13/2020     Lab Results   Component Value Date     11/22/2024     02/13/2020     HDL Cholesterol   Date Value Ref Range Status   02/13/2020 67 >49 mg/dL Final     Direct Measure HDL   Date Value Ref Range Status   11/22/2024 60 >=50 mg/dL Final   ]  GFR Estimate   Date Value Ref Range Status   11/22/2024 >90 >60 mL/min/1.73m2 Final     Comment:     eGFR calculated using 2021 CKD-EPI equation.   02/13/2020 83 >60 mL/min/[1.73_m2] Final     Comment:     Non  GFR Calc  Starting 12/18/2018, serum creatinine based estimated GFR (eGFR) will be   calculated using the Chronic Kidney Disease Epidemiology Collaboration   (CKD-EPI) equation.       GFR Estimate If Black   Date Value Ref Range Status   02/13/2020 >90 >60 mL/min/[1.73_m2] Final     Comment:      GFR Calc  Starting 12/18/2018, serum creatinine based estimated GFR (eGFR) will be   calculated using the Chronic Kidney Disease Epidemiology Collaboration   (CKD-EPI) equation.       Lab Results   Component Value Date    CR 0.69 11/22/2024    CR 0.74 02/13/2020     No results found for: \"MICROALBUMIN\"    Healthy Eating:  How many times a week on average do you eat food made away from home (restaurant/take-out)?: 0  Meals include: Breakfast, Lunch, Dinner  Breakfast: five different fruits(1 cup berries/melon/kiwi) with cottage cheese, coffee  Dinner: 2:30-3p: home made soups or steak/salad or roast/fresh vegetables or fish/vegetables  Snacks: grapes " early evening or walnuts/almonds/peanuts  Other: no fast foods, no processed foods, no fried foods  Beverages: Water, Tea, Coffee, Juice (teas)  Has patient met with a dietitian in the past?: No    Being Active:  Being Active Assessed Today: Yes (patient is doing strengthening for back/upper body 3-4x/wk-30-40 minutes)  Exercise:: Yes (had Moh's procedure for skin cancer x 3 over the summer,  is sick, surgery 9/7, very sick, caregiver)  Days per week of moderate to strenuous exercise (like a brisk walk): 3  Barrier to exercise: None    Monitoring:  Monitoring Assessed Today: Yes (patient is not currently checking BG)    NA    Taking Medications:      Taking Medication Assessed Today: Yes (she is not currently taking medication at home)    Problem Solving:  Problem Solving Assessed Today: Yes  Is the patient at risk for hypoglycemia?: No              Reducing Risks:  Reducing Risks Assessed Today: Yes  Diabetes Risks: Age over 45 years, Sedentary Lifestyle, Family History  CAD Risks: Diabetes Mellitus, Sedentary lifestyle    Healthy Coping:  Healthy Coping Assessed Today: Yes  Emotional response to diabetes: Ready to learn  Informal Support system:: Children, Friends, Significant other  Stage of change: PREPARATION (Decided to change - considering how)  Patient Activation Measure Survey Score:      2/10/2020     7:15 AM 7/24/2023    12:34 PM   RAZ Score (Last Two)   RAZ Raw Score 40 30   Activation Score 100 56   RAZ Level 4 3         Care Plan and Education Provided:  Healthy Eating: Plate planning method, Being Active: Finding a physical activity routine that works for you, and Reducing Risks: Dental care, Eye care, and Goal for A1c, how it relates to glucose and how often to check        Time Spent: 41 minutes  Encounter Type: Individual    Any diabetes medication dose changes were made via the CDE Protocol per the patient's referring provider. A copy of this encounter was shared with the provider.

## 2024-12-04 NOTE — LETTER
12/4/2024         RE: Sammi Manzano  [id number]  [facility] [id number]        Dear Colleague,    Thank you for referring your patient, Sammi Manzano, to the [facility]. Please see a copy of my visit note below.    Diabetes Self-Management Education & Support/ 41 minutes    Presents for: Initial Assessment for new diagnosis    Type of Service: Telephone Visit    Originating Location (Patient Location): Home  Distant Location (Provider Location): [facility]  Mode of Communication:  Telephone    Telephone Visit Start Time:  8:30  Telephone Visit End Time (telephone visit stop time): 9:11    How would patient like to obtain AVS? Mail a copy      ASSESSMENT:  Sammi has had prediabetes for at least 12 years. She has a family history of diabetes with her mother and maternal great grandmother. Sammi is currently not taking any medication for diabetes nor is she checking her blood glucose at home. She has had a stressful year with her own health issues and with health issues for her spouse. Sammi has not been able to maintain a regular routine of exercise, she was recently doing physical therapy exercises to strengthen her back and shoulders: 30-40 minutes 3-4x/week, but her spouse was in the hospital for the last week and she fell off track. Sammi's weight decreased 7# during the hosptial visit for her spouse, she would like to lose additional weight. She has been under quite a bit of stress with her husbands health concerns and sleep is currently poor, with him waking every 2 hours overnight.  Sammi does allow herself to nap in the afternoon and she takes time to read for relaxation. She has a good support system with family and friends. Sammi consumes two main meals/day: protein, lots of vegetables and fresh fruit, limited CHO foods, no processed foods or fast food, no sugary beverages.     Patient's most recent   Lab Results   Component  Value Date    A1C 6.7 11/22/2024    A1C 5.9 02/13/2020     is meeting goal of <7.0    Diabetes knowledge and skills assessment:   Patient is knowledgeable in diabetes management concepts related to: new diagnosis, topics reviewed    Continue education with the following diabetes management concepts: per patient request or need    Based on learning assessment above, most appropriate setting for further diabetes education would be: Individual setting.      PLAN  Maintain weight at 164# give or take 2# for the next two weeks, then aim for an additional 5% (8#) weight loss over the next 3-6 months.  Resume physical therapy exercises for back and shoulder: 30-40 minutes once weekly, increase to 3-4x/week as able.  Following the healthy plate guidelines( sent in the mail).  Aim for a minimum of 6 hours/night for sleep as able and allow yourself a nap in the afternoon when sleep is interruptive overnight.  Allow time each day for reading and relaxation.  Schedule a diabetic eye exam.   Schedule a dental cleaning if you have not had one done in the last six months.    Topics to cover at upcoming visits: per patient request or need    Follow-up: PRN    See Care Plan for co-developed, patient-state behavior change goals.  AVS provided for patient today.    Education Materials Provided:  Living Healthy with Diabetes and My Plate Planner      SUBJECTIVE/OBJECTIVE:  Presents for: Initial Assessment for new diagnosis  Accompanied by: Self  Diabetes education in the past 24mo: No  Focus of Visit: Healthy Coping, Healthy Eating, Being Active, Diabetes Pathophysiology  Diabetes type: Type 2  Date of diagnosis: 11/22/24  Transportation concerns: No  Other concerns:: None  Cultural Influences/Ethnic Background:  Not  or       Diabetes Symptoms & Complications:  Weight trend: Decreasing (7# in the last week with spouse in the hospital)       Patient Problem List and Family Medical History reviewed for relevant medical  "history, current medical status, and diabetes risk factors.    Vitals:  There were no vitals taken for this visit.  Estimated body mass index is 30.61 kg/m  as calculated from the following:    Height as of 11/27/24: 1.6 m (5' 3\").    Weight as of 11/27/24: 78.4 kg (172 lb 12.8 oz).   Last 3 BP:   BP Readings from Last 3 Encounters:   11/27/24 (!) 191/108   11/22/24 138/86   07/24/23 115/76       History   Smoking Status     Former     Packs/day: 1.00     Years: 17.00     Types: Cigarettes     Start date: 1/1/1969     Quit date: 1/1/1986   Smokeless Tobacco     Never       Labs:  Lab Results   Component Value Date    A1C 6.7 11/22/2024    A1C 5.9 02/13/2020     Lab Results   Component Value Date     11/22/2024     08/23/2021     02/13/2020     Lab Results   Component Value Date     11/22/2024     02/13/2020     HDL Cholesterol   Date Value Ref Range Status   02/13/2020 67 >49 mg/dL Final     Direct Measure HDL   Date Value Ref Range Status   11/22/2024 60 >=50 mg/dL Final   ]  GFR Estimate   Date Value Ref Range Status   11/22/2024 >90 >60 mL/min/1.73m2 Final     Comment:     eGFR calculated using 2021 CKD-EPI equation.   02/13/2020 83 >60 mL/min/[1.73_m2] Final     Comment:     Non  GFR Calc  Starting 12/18/2018, serum creatinine based estimated GFR (eGFR) will be   calculated using the Chronic Kidney Disease Epidemiology Collaboration   (CKD-EPI) equation.       GFR Estimate If Black   Date Value Ref Range Status   02/13/2020 >90 >60 mL/min/[1.73_m2] Final     Comment:      GFR Calc  Starting 12/18/2018, serum creatinine based estimated GFR (eGFR) will be   calculated using the Chronic Kidney Disease Epidemiology Collaboration   (CKD-EPI) equation.       Lab Results   Component Value Date    CR 0.69 11/22/2024    CR 0.74 02/13/2020     No results found for: \"MICROALBUMIN\"    Healthy Eating:  How many times a week on average do you eat food made " away from home (restaurant/take-out)?: 0  Meals include: Breakfast, Lunch, Dinner  Breakfast: five different fruits(1 cup berries/melon/kiwi) with cottage cheese, coffee  Dinner: 2:30-3p: home made soups or steak/salad or roast/fresh vegetables or fish/vegetables  Snacks: grapes early evening or walnuts/almonds/peanuts  Other: no fast foods, no processed foods, no fried foods  Beverages: Water, Tea, Coffee, Juice (teas)  Has patient met with a dietitian in the past?: No    Being Active:  Being Active Assessed Today: Yes (patient is doing strengthening for back/upper body 3-4x/wk-30-40 minutes)  Exercise:: Yes (had Moh's procedure for skin cancer x 3 over the summer,  is sick, surgery 9/7, very sick, caregiver)  Days per week of moderate to strenuous exercise (like a brisk walk): 3  Barrier to exercise: None    Monitoring:  Monitoring Assessed Today: Yes (patient is not currently checking BG)    NA    Taking Medications:      Taking Medication Assessed Today: Yes (she is not currently taking medication at home)    Problem Solving:  Problem Solving Assessed Today: Yes  Is the patient at risk for hypoglycemia?: No              Reducing Risks:  Reducing Risks Assessed Today: Yes  Diabetes Risks: Age over 45 years, Sedentary Lifestyle, Family History  CAD Risks: Diabetes Mellitus, Sedentary lifestyle    Healthy Coping:  Healthy Coping Assessed Today: Yes  Emotional response to diabetes: Ready to learn  Informal Support system:: Children, Friends, Significant other  Stage of change: PREPARATION (Decided to change - considering how)  Patient Activation Measure Survey Score:      2/10/2020     7:15 AM 7/24/2023    12:34 PM   RAZ Score (Last Two)   RAZ Raw Score 40 30   Activation Score 100 56   RAZ Level 4 3         Care Plan and Education Provided:  Healthy Eating: Plate planning method, Being Active: Finding a physical activity routine that works for you, and Reducing Risks: Dental care, Eye care, and Goal for A1c,  how it relates to glucose and how often to check        Time Spent: 41 minutes  Encounter Type: Individual    Any diabetes medication dose changes were made via the CDE Protocol per the patient's referring provider. A copy of this encounter was shared with the provider.

## 2024-12-04 NOTE — PATIENT INSTRUCTIONS
Maintain weight at 164# give or take 2# for the next two weeks, then aim for an additional 5% (8#) weight loss over the next 3-6 months.  Resume physical therapy exercises for back and shoulder: 30-40 minutes once weekly, increase to 3-4x/week as able.  Following the healthy plate guidelines( sent in the mail).  Aim for a minimum of 6 hours/night for sleep as able and allow yourself a nap in the afternoon when sleep is interruptive overnight.  Allow time each day for reading and relaxation.  Schedule a diabetic eye exam.   Schedule a dental cleaning if you have not had one done in the last six months.

## 2025-01-22 ENCOUNTER — APPOINTMENT (OUTPATIENT)
Age: 75
Setting detail: DERMATOLOGY
End: 2025-01-22

## 2025-01-22 DIAGNOSIS — Z85.828 PERSONAL HISTORY OF OTHER MALIGNANT NEOPLASM OF SKIN: ICD-10-CM

## 2025-01-22 DIAGNOSIS — L21.8 OTHER SEBORRHEIC DERMATITIS: ICD-10-CM

## 2025-01-22 DIAGNOSIS — L82.1 OTHER SEBORRHEIC KERATOSIS: ICD-10-CM

## 2025-01-22 PROCEDURE — ? TREATMENT REGIMEN

## 2025-01-22 PROCEDURE — 99213 OFFICE O/P EST LOW 20 MIN: CPT

## 2025-01-22 PROCEDURE — ? OBSERVATION

## 2025-01-22 PROCEDURE — ? COUNSELING

## 2025-01-22 ASSESSMENT — LOCATION ZONE DERM
LOCATION ZONE: SCALP
LOCATION ZONE: NOSE

## 2025-01-22 ASSESSMENT — LOCATION SIMPLE DESCRIPTION DERM
LOCATION SIMPLE: SCALP
LOCATION SIMPLE: NOSE
LOCATION SIMPLE: ANTERIOR SCALP

## 2025-01-22 ASSESSMENT — LOCATION DETAILED DESCRIPTION DERM
LOCATION DETAILED: RIGHT CENTRAL FRONTAL SCALP
LOCATION DETAILED: MID-FRONTAL SCALP
LOCATION DETAILED: NASAL TIP

## 2025-01-22 NOTE — PROCEDURE: TREATMENT REGIMEN
Detail Level: Zone
Plan: Offered a prescription for ketoconazole shampoo and Diprolene lotion. She defers any prescriptions at this time and will treat with OTC shampoos to begin with.
Initiate Treatment: Head and Shoulders or Selsun Blue shampoo 2-3 times per week

## 2025-01-22 NOTE — HPI: NON-MELANOMA SKIN CANCER F/U (HISTORY OF NMSC)
How Many Skin Cancers Have You Had?: one
What Is The Reason For Today's Visit?: Follow Up Non-Melanoma Skin Cancer
Additional History: The patient had Mohs surgery for a basal cell carcinoma on 7/9/24 with an outside closure with Dr. Solorzano.

## 2025-01-22 NOTE — PROCEDURE: OBSERVATION
Body Location Override (Optional - Billing Will Still Be Based On Selected Body Map Location If Applicable): left nasal tip
Detail Level: Detailed
Size Of Lesion In Cm (Optional): 0

## 2025-01-23 ENCOUNTER — PATIENT OUTREACH (OUTPATIENT)
Dept: CARE COORDINATION | Facility: CLINIC | Age: 75
End: 2025-01-23
Payer: COMMERCIAL

## 2025-01-27 ENCOUNTER — OFFICE VISIT (OUTPATIENT)
Dept: FAMILY MEDICINE | Facility: CLINIC | Age: 75
End: 2025-01-27
Payer: COMMERCIAL

## 2025-01-27 VITALS
HEIGHT: 63 IN | RESPIRATION RATE: 13 BRPM | WEIGHT: 166 LBS | OXYGEN SATURATION: 97 % | SYSTOLIC BLOOD PRESSURE: 138 MMHG | TEMPERATURE: 95.9 F | DIASTOLIC BLOOD PRESSURE: 88 MMHG | BODY MASS INDEX: 29.41 KG/M2 | HEART RATE: 68 BPM

## 2025-01-27 DIAGNOSIS — Z00.00 ENCOUNTER FOR MEDICARE ANNUAL WELLNESS EXAM: Primary | ICD-10-CM

## 2025-01-27 DIAGNOSIS — E11.9 TYPE 2 DIABETES MELLITUS WITHOUT COMPLICATION, WITHOUT LONG-TERM CURRENT USE OF INSULIN (H): ICD-10-CM

## 2025-01-27 DIAGNOSIS — Z12.31 VISIT FOR SCREENING MAMMOGRAM: ICD-10-CM

## 2025-01-27 RX ORDER — TIMOLOL MALEATE 2.5 MG/ML
SOLUTION/ DROPS OPHTHALMIC
COMMUNITY
Start: 2024-12-12

## 2025-01-27 SDOH — HEALTH STABILITY: PHYSICAL HEALTH: ON AVERAGE, HOW MANY DAYS PER WEEK DO YOU ENGAGE IN MODERATE TO STRENUOUS EXERCISE (LIKE A BRISK WALK)?: 4 DAYS

## 2025-01-27 ASSESSMENT — SOCIAL DETERMINANTS OF HEALTH (SDOH): HOW OFTEN DO YOU GET TOGETHER WITH FRIENDS OR RELATIVES?: PATIENT DECLINED

## 2025-01-27 NOTE — PATIENT INSTRUCTIONS
Patient Education   Preventive Care Advice   This is general advice given by our system to help you stay healthy. However, your care team may have specific advice just for you. Please talk to your care team about your preventive care needs.  Nutrition  Eat 5 or more servings of fruits and vegetables each day.  Try wheat bread, brown rice and whole grain pasta (instead of white bread, rice, and pasta).  Get enough calcium and vitamin D. Check the label on foods and aim for 100% of the RDA (recommended daily allowance).  Lifestyle  Exercise at least 150 minutes each week  (30 minutes a day, 5 days a week).  Do muscle strengthening activities 2 days a week. These help control your weight and prevent disease.  No smoking.  Wear sunscreen to prevent skin cancer.  Have a dental exam and cleaning every 6 months.  Yearly exams  See your health care team every year to talk about:  Any changes in your health.  Any medicines your care team has prescribed.  Preventive care, family planning, and ways to prevent chronic diseases.  Shots (vaccines)   HPV shots (up to age 26), if you've never had them before.  Hepatitis B shots (up to age 59), if you've never had them before.  COVID-19 shot: Get this shot when it's due.  Flu shot: Get a flu shot every year.  Tetanus shot: Get a tetanus shot every 10 years.  Pneumococcal, hepatitis A, and RSV shots: Ask your care team if you need these based on your risk.  Shingles shot (for age 50 and up)  General health tests  Diabetes screening:  Starting at age 35, Get screened for diabetes at least every 3 years.  If you are younger than age 35, ask your care team if you should be screened for diabetes.  Cholesterol test: At age 39, start having a cholesterol test every 5 years, or more often if advised.  Bone density scan (DEXA): At age 50, ask your care team if you should have this scan for osteoporosis (brittle bones).  Hepatitis C: Get tested at least once in your life.  STIs (sexually  transmitted infections)  Before age 24: Ask your care team if you should be screened for STIs.  After age 24: Get screened for STIs if you're at risk. You are at risk for STIs (including HIV) if:  You are sexually active with more than one person.  You don't use condoms every time.  You or a partner was diagnosed with a sexually transmitted infection.  If you are at risk for HIV, ask about PrEP medicine to prevent HIV.  Get tested for HIV at least once in your life, whether you are at risk for HIV or not.  Cancer screening tests  Cervical cancer screening: If you have a cervix, begin getting regular cervical cancer screening tests starting at age 21.  Breast cancer scan (mammogram): If you've ever had breasts, begin having regular mammograms starting at age 40. This is a scan to check for breast cancer.  Colon cancer screening: It is important to start screening for colon cancer at age 45.  Have a colonoscopy test every 10 years (or more often if you're at risk) Or, ask your provider about stool tests like a FIT test every year or Cologuard test every 3 years.  To learn more about your testing options, visit:   .  For help making a decision, visit:   https://bit.ly/fo62288.  Prostate cancer screening test: If you have a prostate, ask your care team if a prostate cancer screening test (PSA) at age 55 is right for you.  Lung cancer screening: If you are a current or former smoker ages 50 to 80, ask your care team if ongoing lung cancer screenings are right for you.  For informational purposes only. Not to replace the advice of your health care provider. Copyright   2023 [facility] [employer]. All rights reserved. Clinically reviewed by the [facility]. [employer] [id number] - REV 01/24.  Hearing Loss: Care Instructions  Overview     Hearing loss is a sudden or slow decrease in how well you hear. It can range from slight to profound. Permanent hearing loss can occur with aging. It also can  happen when you are exposed long-term to loud noise. Examples include listening to loud music, riding motorcycles, or being around other loud machines.  Hearing loss can affect your work and home life. It can make you feel lonely or depressed. You may feel that you have lost your independence. But hearing aids and other devices can help you hear better and feel connected to others.  Follow-up care is a key part of your treatment and safety. Be sure to make and go to all appointments, and call your doctor if you are having problems. It's also a good idea to know your test results and keep a list of the medicines you take.  How can you care for yourself at home?  Avoid loud noises whenever possible. This helps keep your hearing from getting worse.  Always wear hearing protection around loud noises.  Wear a hearing aid as directed.  A professional can help you pick a hearing aid that will work best for you.  You can also get hearing aids over the counter for mild to moderate hearing loss.  Have hearing tests as your doctor suggests. They can show whether your hearing has changed. Your hearing aid may need to be adjusted.  Use other devices as needed. These may include:  Telephone amplifiers and hearing aids that can connect to a television, stereo, radio, or microphone.  Devices that use lights or vibrations. These alert you to the doorbell, a ringing telephone, or a baby monitor.  Television closed-captioning. This shows the words at the bottom of the screen. Most new TVs can do this.  TTY (text telephone). This lets you type messages back and forth on the telephone instead of talking or listening. These devices are also called TDD. When messages are typed on the keyboard, they are sent over the phone line to a receiving TTY. The message is shown on a monitor.  Use text messaging, social media, and email if it is hard for you to communicate by telephone.  Try to learn a listening technique called speechreading. It is  "not lipreading. You pay attention to people's gestures, expressions, posture, and tone of voice. These clues can help you understand what a person is saying. Face the person you are talking to, and have them face you. Make sure the lighting is good. You need to see the other person's face clearly.  Think about counseling if you need help to adjust to your hearing loss.  When should you call for help?  Watch closely for changes in your health, and be sure to contact your doctor if:    You think your hearing is getting worse.     You have new symptoms, such as dizziness or nausea.   Where can you learn more?  Go to https://www.[employer].net/patiented  Enter R798 in the search box to learn more about \"Hearing Loss: Care Instructions.\"  Current as of: September 27, 2023  Content Version: 14.3    2024 [employer].   Care instructions adapted under license by your healthcare professional. If you have questions about a medical condition or this instruction, always ask your healthcare professional. [employer] disclaims any warranty or liability for your use of this information.    Learning About Stress  What is stress?     Stress is your body's response to a hard situation. Your body can have a physical, emotional, or mental response. Stress is a fact of life for most people, and it affects everyone differently. What causes stress for you may not be stressful for someone else.  A lot of things can cause stress. You may feel stress when you go on a job interview, take a test, or run a race. This kind of short-term stress is normal and even useful. It can help you if you need to work hard or react quickly. For example, stress can help you finish an important job on time.  Long-term stress is caused by ongoing stressful situations or events. Examples of long-term stress include long-term health problems, ongoing problems at work, or conflicts in your family. Long-term stress can harm your health.  How " does stress affect your health?  When you are stressed, your body responds as though you are in danger. It makes hormones that speed up your heart, make you breathe faster, and give you a burst of energy. This is called the fight-or-flight stress response. If the stress is over quickly, your body goes back to normal and no harm is done.  But if stress happens too often or lasts too long, it can have bad effects. Long-term stress can make you more likely to get sick, and it can make symptoms of some diseases worse. If you tense up when you are stressed, you may develop neck, shoulder, or low back pain. Stress is linked to high blood pressure and heart disease.  Stress also harms your emotional health. It can make you murillo, tense, or depressed. Your relationships may suffer, and you may not do well at work or school.  What can you do to manage stress?  You can try these things to help manage stress:   Do something active. Exercise or activity can help reduce stress. Walking is a great way to get started. Even everyday activities such as housecleaning or yard work can help.  Try yoga or neeta chi. These techniques combine exercise and meditation. You may need some training at first to learn them.  Do something you enjoy. For example, listen to music or go to a movie. Practice your hobby or do volunteer work.  Meditate. This can help you relax, because you are not worrying about what happened before or what may happen in the future.  Do guided imagery. Imagine yourself in any setting that helps you feel calm. You can use online videos, books, or a teacher to guide you.  Do breathing exercises. For example:  From a standing position, bend forward from the waist with your knees slightly bent. Let your arms dangle close to the floor.  Breathe in slowly and deeply as you return to a standing position. Roll up slowly and lift your head last.  Hold your breath for just a few seconds in the standing position.  Breathe out  "slowly and bend forward from the waist.  Let your feelings out. Talk, laugh, cry, and express anger when you need to. Talking with supportive friends or family, a counselor, or a wojciech leader about your feelings is a healthy way to relieve stress. Avoid discussing your feelings with people who make you feel worse.  Write. It may help to write about things that are bothering you. This helps you find out how much stress you feel and what is causing it. When you know this, you can find better ways to cope.  What can you do to prevent stress?  You might try some of these things to help prevent stress:  Manage your time. This helps you find time to do the things you want and need to do.  Get enough sleep. Your body recovers from the stresses of the day while you are sleeping.  Get support. Your family, friends, and community can make a difference in how you experience stress.  Limit your news feed. Avoid or limit time on social media or news that may make you feel stressed.  Do something active. Exercise or activity can help reduce stress. Walking is a great way to get started.  Where can you learn more?  Go to https://www.[employer].net/patiented  Enter N032 in the search box to learn more about \"Learning About Stress.\"  Current as of: October 24, 2023  Content Version: 14.3    2024 [employer].   Care instructions adapted under license by your healthcare professional. If you have questions about a medical condition or this instruction, always ask your healthcare professional. [employer] disclaims any warranty or liability for your use of this information.       "

## 2025-01-27 NOTE — PROGRESS NOTES
"Preventive Care Visit  [facility]  April DOMINIC Chavez MD, Family Medicine  Jan 27, 2025      Assessment & Plan     Encounter for Medicare annual wellness exam  Exam completed today, routine health maintenance items updated as able.  Labs ordered.  Follow up one year or sooner as needed.  Declines COVID vaccine.    Type 2 diabetes mellitus without complication, without long-term current use of insulin (H)  A1c on 11-22-24 was 6.7.  No medications.  Not checking sugars.  Recommend follow up in 3-4 months, or sooner as needed.    Visit for screening mammogram  - MA Screen Bilateral w/Justin; Future      BMI  Estimated body mass index is 29.64 kg/m  as calculated from the following:    Height as of this encounter: 1.594 m (5' 2.75\").    Weight as of this encounter: 75.3 kg (166 lb).   Weight management plan: Discussed healthy diet and exercise guidelines    Counseling  Appropriate preventive services were addressed with this patient via screening, questionnaire, or discussion as appropriate for fall prevention, nutrition, physical activity, Tobacco-use cessation, social engagement, weight loss and cognition.  Checklist reviewing preventive services available has been given to the patient.      See Patient Instructions    Subjective   Sammi is a 74 year old, presenting for the following:  Wellness Visit        1/27/2025    11:33 AM   Additional Questions   Roomed by Tenisha KIRK    Here for wellness.    Blood pressures are controlled at home.  High today, she needs blood drawn and does not like needles or blood.    She had Mohs on her nose, this was done on July 2024, Basal cell.  Healing well, no further follow up needed. Will be seen for full body check in a year, she has been getting cherry hemangiomas.  Following with Dermatology.    Does not want to check her blood sugars, recently diagnosed with diabetes.    Declines covid vaccine today.    Health Care Directive  Patient does not " have a Health Care Directive: Patient states has Advance Directive and will bring in a copy to clinic.      1/27/2025   General Health   How would you rate your overall physical health? Excellent   Feel stress (tense, anxious, or unable to sleep) To some extent   (!) STRESS CONCERN      1/27/2025   Nutrition   Diet: Regular (no restrictions)         1/27/2025   Exercise   Days per week of moderate/strenous exercise 4 days         1/27/2025   Social Factors   Frequency of gathering with friends or relatives Patient declined   Worry food won't last until get money to buy more No   Food not last or not have enough money for food? No   Do you have housing? (Housing is defined as stable permanent housing and does not include staying ouside in a car, in a tent, in an abandoned building, in an overnight shelter, or couch-surfing.) Yes   Are you worried about losing your housing? No   Lack of transportation? No   Unable to get utilities (heat,electricity)? No         1/27/2025   Fall Risk   Fallen 2 or more times in the past year? No    Trouble with walking or balance? No        Proxy-reported          1/27/2025   Activities of Daily Living- Home Safety   Needs help with the following daily activites None of the above   Safety concerns in the home None of the above         1/27/2025   Dental   Dentist two times every year? Yes         1/27/2025   Hearing Screening   Hearing concerns? (!) I FEEL THAT PEOPLE ARE MUMBLING OR NOT SPEAKING CLEARLY.    (!) I NEED TO ASK PEOPLE TO SPEAK UP OR REPEAT THEMSELVES.    (!) IT'S HARD TO FOLLOW A CONVERSATION IN A NOISY RESTAURANT OR CROWDED ROOM.    (!) TROUBLE UNDESTANDING A SPEAKER IN A PUBLIC MEETING OR Zoroastrian SERVICE.    (!) TROUBLE UNDERSTANDING SOFT OR WHISPERED SPEECH.    (!) TROUBLE UNDERSTANDING SPEECH ON THE TELEPHONE       Multiple values from one day are sorted in reverse-chronological order         1/27/2025   Driving Risk Screening   Patient/family members have concerns  about driving No         2025   General Alertness/Fatigue Screening   Have you been more tired than usual lately? No         2025   Urinary Incontinence Screening   Bothered by leaking urine in past 6 months No         2025   TB Screening   Were you born outside of the [place]? Yes         Today's PHQ-2 Score:       2025    11:12 AM   PHQ-2 (  [employer])   Q1: Little interest or pleasure in doing things 0   Q2: Feeling down, depressed or hopeless 0   PHQ-2 Score 0    Q1: Little interest or pleasure in doing things Not at all   Q2: Feeling down, depressed or hopeless Not at all   PHQ-2 Score 0       Patient-reported           2025   Substance Use   Alcohol more than 3/day or more than 7/wk No   Do you have a current opioid prescription? No   How severe/bad is pain from 1 to 10? 0/10 (No Pain)   Do you use any other substances recreationally? No     Social History     Tobacco Use    Smoking status: Former     Current packs/day: 0.00     Average packs/day: 1 pack/day for 17.0 years (17.0 ttl pk-yrs)     Types: Cigarettes     Start date: 1969     Quit date: 1986     Years since quittin.0    Smokeless tobacco: Never   Vaping Use    Vaping status: Never Used   Substance Use Topics    Alcohol use: No    Drug use: No           2024   LAST FHS-7 RESULTS   1st degree relative breast or ovarian cancer No   Any relative bilateral breast cancer No   Any male have breast cancer No   Any ONE woman have BOTH breast AND ovarian cancer No   Any woman with breast cancer before 50yrs No   2 or more relatives with breast AND/OR ovarian cancer No   2 or more relatives with breast AND/OR bowel cancer No        Mammogram Screening - Mammogram every 1-2 years updated in Health Maintenance based on mutual decision making    ASCVD Risk   The 10-year ASCVD risk score (Rowdy DOS SANTOS, et al., 2019) is: 38.3%    Values used to calculate the score:      Age: 74 years      Sex: Female      Is Non-  : No      Diabetic: Yes      Tobacco smoker: No      Systolic Blood Pressure: 138 mmHg      Is BP treated: Yes      HDL Cholesterol: 60 mg/dL      Total Cholesterol: 225 mg/dL          Reviewed and updated as needed this visit by Provider     Meds   Med Hx  Surg Hx  Fam Hx            Past Medical History:   Diagnosis Date    Chronic cough 3/7/2015    Cough - postinfectious 3/6/2015    Diverticulosis 3/28/2011    Glucose intolerance (impaired glucose tolerance) 3/28/2011    HL (hearing loss) 3/28/2011    Hyperlipidemia LDL goal <160 10/31/2010    Hypertension     OA (osteoarthritis) of knee 1/12/2013    Osteopenia 2/18/2009    Overweight (BMI 25.0-29.9) 3/29/2012     Past Surgical History:   Procedure Laterality Date    COLONOSCOPY N/A 2/22/2017    Procedure: COLONOSCOPY;  Surgeon: Lenin Ferriera MD;  Location:  GI    TONSILLECTOMY      at younger age-?4-5     Lab work is in process  Labs reviewed in EPIC  BP Readings from Last 3 Encounters:   01/27/25 138/88   11/27/24 (!) 191/108   11/22/24 138/86    Wt Readings from Last 3 Encounters:   01/27/25 75.3 kg (166 lb)   11/27/24 78.4 kg (172 lb 12.8 oz)   11/22/24 77.6 kg (171 lb)                  Patient Active Problem List   Diagnosis    Hyperlipidemia    Osteopenia    Glucose intolerance (impaired glucose tolerance)    Family history of diabetes mellitus    HL (hearing loss)    Diverticulosis    Overweight (BMI 25.0-29.9)    OA (osteoarthritis) of knee    Benign essential hypertension    Hx of seborrheic keratosis    Solar lentigo    Cherry angioma    Osteoarthritis of right hand, unspecified osteoarthritis type    BCC (basal cell carcinoma), face    Diabetes mellitus, type 2 (H)     Past Surgical History:   Procedure Laterality Date    COLONOSCOPY N/A 2/22/2017    Procedure: COLONOSCOPY;  Surgeon: Lenin Ferreira MD;  Location:  GI    TONSILLECTOMY      at younger age-?4-5       Social History     Tobacco Use    Smoking status: Former      Current packs/day: 0.00     Average packs/day: 1 pack/day for 17.0 years (17.0 ttl pk-yrs)     Types: Cigarettes     Start date: 1969     Quit date: 1986     Years since quittin.0    Smokeless tobacco: Never   Substance Use Topics    Alcohol use: No     Family History   Problem Relation Age of Onset    Diabetes Mother         alive 88 in 2010,stroke in     Psychotic Disorder Mother         Dementia    Hypertension Mother     Cancer Father         liver ca    Family History Negative Maternal Grandmother     Heart Disease Maternal Grandfather         heart attack at age of ?AGE    Cancer Paternal Grandmother         not known details    Family History Negative Paternal Grandfather          from old age    Family History Negative Brother         alive 57    Breast Cancer No family hx of     Cancer - colorectal No family hx of          Current Outpatient Medications   Medication Sig Dispense Refill    acetaminophen (TYLENOL) 500 MG tablet Take 500-1,000 mg by mouth every 6 hours as needed      CALCIUM 600 + D 600-200 MG-UNIT OR TABS 2 q d 3 MONTHS 1 YEAR    lisinopril (ZESTRIL) 20 MG tablet Take 1 tablet (20 mg) by mouth daily. 90 tablet 3    rosuvastatin (CRESTOR) 10 MG tablet Take 1 tablet (10 mg) by mouth daily. 30 tablet 5    timolol maleate (TIMOPTIC) 0.25 % ophthalmic solution INSTILL ONE DROP INTO EACH EYE TWICE DAILY*      trolamine salicylate (ASPERCREME) 10 % external cream Apply topically as needed       Allergies   Allergen Reactions    Augmentin [Amoxicillin-Pot Clavulanate] Other (See Comments), Headache and Nausea and Vomiting     Elevated blood pressure    Hctz [Hydrochlorothiazide] Other (See Comments)     Blurry vision     No Known Drug Allergy      Recent Labs   Lab Test 24  0846 23  1022 21  0945 21  0945 20  0759 19  1013   A1C 6.7* 6.2*  --  5.9* 5.9*  --    * 169*  --  149* 127* 140*   HDL 60 70  --  67 67 68   TRIG 126 104  --   "122 98 101   ALT 18 17  --  23 20 41   CR 0.69 0.73   < > 0.73 0.74 0.75   GFRESTIMATED >90 86   < > 83 83 82   GFRESTBLACK  --   --   --   --  >90 >90   POTASSIUM 4.6 3.9  --  3.8 4.0 4.0    < > = values in this interval not displayed.      Current providers sharing in care for this patient include:  Patient Care Team:  Sofia Rockwell MD as PCP - General (Family Medicine)  Sofia Rockwell MD as Assigned PCP  Maureen Carter PA-C as Physician Assistant (Family Medicine)  Niharika Bobo RD as Registered Dietitian (Dietitian, Registered)    The following health maintenance items are reviewed in Epic and correct as of today:  Health Maintenance   Topic Date Due    COVID-19 Vaccine (7 - 2024-25 season) 09/01/2024    MAMMO SCREENING  02/22/2025    A1C  02/22/2025    RSV VACCINE (1 - 1-dose 75+ series) 03/14/2025    EYE EXAM  04/08/2025    DEXA  05/04/2025    BMP  11/22/2025    LIPID  11/22/2025    MICROALBUMIN  11/22/2025    DIABETIC FOOT EXAM  11/27/2025    MEDICARE ANNUAL WELLNESS VISIT  01/27/2026    ANNUAL REVIEW OF HM ORDERS  01/27/2026    FALL RISK ASSESSMENT  01/27/2026    COLORECTAL CANCER SCREENING  02/22/2027    DTAP/TDAP/TD IMMUNIZATION (3 - Td or Tdap) 03/06/2027    ADVANCE CARE PLANNING  06/17/2029    HEPATITIS C SCREENING  Completed    PHQ-2 (once per calendar year)  Completed    INFLUENZA VACCINE  Completed    Pneumococcal Vaccine: 50+ Years  Completed    HPV IMMUNIZATION  Aged Out    MENINGITIS IMMUNIZATION  Aged Out    RSV MONOCLONAL ANTIBODY  Aged Out    ZOSTER IMMUNIZATION  Discontinued        Objective    Exam  /88 (BP Location: Right arm, Patient Position: Sitting, Cuff Size: Adult Regular)   Pulse 68   Temp (!) 95.9  F (35.5  C) (Tympanic)   Resp 13   Ht 1.594 m (5' 2.75\")   Wt 75.3 kg (166 lb)   SpO2 97%   BMI 29.64 kg/m     Estimated body mass index is 29.64 kg/m  as calculated from the following:    Height as of this encounter: 1.594 m (5' 2.75\").    Weight " as of this encounter: 75.3 kg (166 lb).    Physical Exam  GENERAL: alert and no distress  EYES: Eyes grossly normal to inspection, PERRL and conjunctivae and sclerae normal  HENT: ear canals and TM's normal, nose and mouth without ulcers or lesions  RESP: lungs clear to auscultation - no rales, rhonchi or wheezes  CV: regular rates and rhythm  ABDOMEN: bowel sounds normal  MS: no gross musculoskeletal defects noted, no edema  SKIN: no suspicious lesions or rashes  NEURO: Normal strength and tone, mentation intact and speech normal  PSYCH: mentation appears normal, affect normal/bright         1/27/2025   Mini Cog   Clock Draw Score 2 Normal   3 Item Recall 2 objects recalled   Mini Cog Total Score 4              Signed Electronically by: Sofia Chavez MD

## 2025-01-28 ENCOUNTER — PATIENT OUTREACH (OUTPATIENT)
Dept: CARE COORDINATION | Facility: CLINIC | Age: 75
End: 2025-01-28
Payer: COMMERCIAL

## 2025-01-30 ENCOUNTER — PATIENT OUTREACH (OUTPATIENT)
Dept: CARE COORDINATION | Facility: CLINIC | Age: 75
End: 2025-01-30
Payer: COMMERCIAL

## 2025-03-02 ENCOUNTER — HEALTH MAINTENANCE LETTER (OUTPATIENT)
Age: 75
End: 2025-03-02

## 2025-03-17 ENCOUNTER — ANCILLARY PROCEDURE (OUTPATIENT)
Dept: MAMMOGRAPHY | Facility: CLINIC | Age: 75
End: 2025-03-17
Attending: FAMILY MEDICINE
Payer: COMMERCIAL

## 2025-03-17 ENCOUNTER — LAB (OUTPATIENT)
Dept: LAB | Facility: CLINIC | Age: 75
End: 2025-03-17
Payer: COMMERCIAL

## 2025-03-17 DIAGNOSIS — E11.9 DIABETES MELLITUS, TYPE 2 (H): Primary | ICD-10-CM

## 2025-03-17 DIAGNOSIS — Z12.31 VISIT FOR SCREENING MAMMOGRAM: ICD-10-CM

## 2025-03-17 LAB
EST. AVERAGE GLUCOSE BLD GHB EST-MCNC: 126 MG/DL
HBA1C MFR BLD: 6 % (ref 0–5.6)

## 2025-03-17 PROCEDURE — 83036 HEMOGLOBIN GLYCOSYLATED A1C: CPT

## 2025-03-17 PROCEDURE — 77067 SCR MAMMO BI INCL CAD: CPT | Mod: TC | Performed by: STUDENT IN AN ORGANIZED HEALTH CARE EDUCATION/TRAINING PROGRAM

## 2025-03-17 PROCEDURE — 36415 COLL VENOUS BLD VENIPUNCTURE: CPT

## 2025-03-17 PROCEDURE — 77063 BREAST TOMOSYNTHESIS BI: CPT | Mod: TC | Performed by: STUDENT IN AN ORGANIZED HEALTH CARE EDUCATION/TRAINING PROGRAM

## 2025-05-02 ENCOUNTER — TRANSFERRED RECORDS (OUTPATIENT)
Dept: HEALTH INFORMATION MANAGEMENT | Facility: CLINIC | Age: 75
End: 2025-05-02
Payer: COMMERCIAL

## 2025-05-02 LAB — RETINOPATHY: NORMAL

## 2025-06-21 ENCOUNTER — HEALTH MAINTENANCE LETTER (OUTPATIENT)
Age: 75
End: 2025-06-21

## 2025-07-01 DIAGNOSIS — E11.9 TYPE 2 DIABETES MELLITUS WITHOUT COMPLICATION, WITHOUT LONG-TERM CURRENT USE OF INSULIN (H): ICD-10-CM

## 2025-07-01 DIAGNOSIS — E78.5 HYPERLIPIDEMIA, UNSPECIFIED HYPERLIPIDEMIA TYPE: ICD-10-CM

## 2025-07-01 RX ORDER — ROSUVASTATIN CALCIUM 10 MG/1
10 TABLET, COATED ORAL DAILY
Qty: 90 TABLET | Refills: 1 | Status: SHIPPED | OUTPATIENT
Start: 2025-07-01

## 2025-07-21 ENCOUNTER — OFFICE VISIT (OUTPATIENT)
Dept: FAMILY MEDICINE | Facility: CLINIC | Age: 75
End: 2025-07-21
Payer: COMMERCIAL

## 2025-07-21 VITALS
OXYGEN SATURATION: 96 % | TEMPERATURE: 98.4 F | BODY MASS INDEX: 29.23 KG/M2 | HEART RATE: 68 BPM | HEIGHT: 63 IN | RESPIRATION RATE: 18 BRPM | SYSTOLIC BLOOD PRESSURE: 150 MMHG | WEIGHT: 165 LBS | DIASTOLIC BLOOD PRESSURE: 78 MMHG

## 2025-07-21 DIAGNOSIS — Z78.0 POSTMENOPAUSAL STATUS: ICD-10-CM

## 2025-07-21 DIAGNOSIS — E11.9 TYPE 2 DIABETES MELLITUS WITHOUT COMPLICATION, WITHOUT LONG-TERM CURRENT USE OF INSULIN (H): ICD-10-CM

## 2025-07-21 DIAGNOSIS — I10 BENIGN ESSENTIAL HYPERTENSION: ICD-10-CM

## 2025-07-21 DIAGNOSIS — R21 RASH: Primary | ICD-10-CM

## 2025-07-21 DIAGNOSIS — M85.80 OSTEOPENIA, UNSPECIFIED LOCATION: ICD-10-CM

## 2025-07-21 LAB
EST. AVERAGE GLUCOSE BLD GHB EST-MCNC: 131 MG/DL
HBA1C MFR BLD: 6.2 % (ref 0–5.6)
HOLD SPECIMEN: NORMAL

## 2025-07-21 PROCEDURE — 83036 HEMOGLOBIN GLYCOSYLATED A1C: CPT | Performed by: FAMILY MEDICINE

## 2025-07-21 PROCEDURE — 3077F SYST BP >= 140 MM HG: CPT | Performed by: FAMILY MEDICINE

## 2025-07-21 PROCEDURE — 3078F DIAST BP <80 MM HG: CPT | Performed by: FAMILY MEDICINE

## 2025-07-21 PROCEDURE — 36415 COLL VENOUS BLD VENIPUNCTURE: CPT | Performed by: FAMILY MEDICINE

## 2025-07-21 PROCEDURE — 99214 OFFICE O/P EST MOD 30 MIN: CPT | Performed by: FAMILY MEDICINE

## 2025-07-21 PROCEDURE — 3044F HG A1C LEVEL LT 7.0%: CPT | Performed by: FAMILY MEDICINE

## 2025-07-21 NOTE — PROGRESS NOTES
"  Assessment & Plan     Rash  Unclear etiology of rash, does not appear consistent with bug bite, eczema, psoriasis, or ecchymosis.  Recommend continued monitoring without treatment given lack of pain or itching.  If not improving, could consider topical triamcinolone.    Type 2 diabetes mellitus without complication, without long-term current use of insulin (H)  Well-controlled with diet, follow-up in 6 months or sooner as needed.  - HEMOGLOBIN A1C; Future  - HEMOGLOBIN A1C    Osteopenia, unspecified location  Postmenopausal status  - DEXA HIP/PELVIS/SPINE - Future; Future    Benign essential hypertension  Recommended monitoring blood pressure at home, discussed that if elevated would need to consider additional medication given lisinopril is at max dose.    BMI  Estimated body mass index is 29.23 kg/m  as calculated from the following:    Height as of this encounter: 1.6 m (5' 3\").    Weight as of this encounter: 74.8 kg (165 lb).       The longitudinal plan of care for the diagnosis(es)/condition(s) as documented were addressed during this visit. Due to the added complexity in care, I will continue to support Sammi in the subsequent management and with ongoing continuity of care.    Subjective   Sammi is a 75 year old, presenting for the following health issues:  Diabetes and Rash        7/21/2025    10:17 AM   Additional Questions   Roomed by Hui ROBLES     History of Present Illness       Diabetes:   She presents for follow up of diabetes.    She is not checking blood glucose.         She has no concerns regarding her diabetes at this time.   She is not experiencing numbness or burning in feet, excessive thirst, blurry vision, weight changes or redness, sores or blisters on feet.           Reason for visit:  Rash    She eats 4 or more servings of fruits and vegetables daily.She consumes 0 sweetened beverage(s) daily.She exercises with enough effort to increase her heart rate 9 or less minutes per day.  She exercises " "with enough effort to increase her heart rate 3 or less days per week.   She is taking medications regularly.          Rash  Onset/Duration: 4 days ago-pt noticed this after looking at the garden. Did have 4 small red bites.    Description  Location: Left ankle   Character: red  Itching: no  Intensity:  mild  Progression of Symptoms:  same  Accompanying signs and symptoms:   Fever: No  Body aches or joint pain: No  Sore throat symptoms: No  Recent cold symptoms: No  History:           Previous episodes of similar rash: None  New exposures:  Being in the garden   Recent travel: No  Exposure to similar rash: No  Precipitating or alleviating factors: none   Therapies tried and outcome: hydrocortisone cream -  make it worse-more red and raised     Picked kale at the edge of the garden, did not feel any bites.  Does not itch or burn, had 4 spots that itched.     She is eating a lot of salads and arugula, a lot of fruit this time of year, lots of fruits. Lost 10 lbs, goal is 155 lbs.She does not check her sugars and will not.    She does not check her BP at home.      Objective    BP (!) 150/78 (BP Location: Left arm, Patient Position: Sitting, Cuff Size: Adult Large)   Pulse 68   Temp 98.4  F (36.9  C) (Oral)   Resp 18   Ht 1.6 m (5' 3\")   Wt 74.8 kg (165 lb)   SpO2 96%   BMI 29.23 kg/m    Body mass index is 29.23 kg/m .  Physical Exam   GENERAL: alert and no distress  SKIN: sharply demarcated, violaceous flat irregularly shaped patch of skin just left the of left lateral malleolus.  Does not los.  PSYCH: mentation appears normal, affect normal/bright    Recent Results (from the past 24 hours)   HEMOGLOBIN A1C   Result Value Ref Range    Estimated Average Glucose 131 (H) <117 mg/dL    Hemoglobin A1C 6.2 (H) 0.0 - 5.6 %   Extra Tube    Narrative    The following orders were created for panel order Extra Tube.  Procedure                               Abnormality         Status                     ---------        "                        -----------         ------                     Extra Red Top Tube[[id number]]                              Final result               Extra Green Top (Lithiu...[[id number]]                                                 Extra Purple Top Tube[[id number]]                                                        Please view results for these tests on the individual orders.   Extra Red Top Tube   Result Value Ref Range    Hold Specimen JIC            Signed Electronically by: Sofia Chavez MD

## (undated) DEVICE — KIT ENDO TURNOVER/PROCEDURE CARRY-ON 101822

## (undated) RX ORDER — FENTANYL CITRATE 50 UG/ML
INJECTION, SOLUTION INTRAMUSCULAR; INTRAVENOUS
Status: DISPENSED
Start: 2017-02-22